# Patient Record
Sex: FEMALE | Race: WHITE | Employment: FULL TIME | URBAN - METROPOLITAN AREA
[De-identification: names, ages, dates, MRNs, and addresses within clinical notes are randomized per-mention and may not be internally consistent; named-entity substitution may affect disease eponyms.]

---

## 2017-04-24 ENCOUNTER — TELEPHONE (OUTPATIENT)
Dept: FAMILY MEDICINE | Facility: CLINIC | Age: 57
End: 2017-04-24

## 2017-04-24 DIAGNOSIS — Z12.11 SCREEN FOR COLON CANCER: Primary | ICD-10-CM

## 2017-04-24 NOTE — TELEPHONE ENCOUNTER
Panel Management Review      Patient has the following on her problem list: None      Composite cancer screening  Chart review shows that this patient is due/due soon for the following Fecal Colorectal (FIT)  Summary:    Patient is due/failing the following:   FIT    Action needed:   Routed to provider for review.    Type of outreach:    None, routed to provider for review.    Questions for provider review:    None                                                                                                                                    Angela Sheets MA       Chart routed to Provider .

## 2017-07-12 ENCOUNTER — TELEPHONE (OUTPATIENT)
Dept: FAMILY MEDICINE | Facility: CLINIC | Age: 57
End: 2017-07-12

## 2017-07-12 NOTE — TELEPHONE ENCOUNTER
Reason for call:  Other   Patient called regarding (reason for call): call back  Additional comments: Patient would like to have a H.Pylori test.; she would like the breath one if possible. Please contact patient with steps.    Phone number to reach patient:  Home number on file [phone] (home)    Best Time:  anytime    Can we leave a detailed message on this number?  YES

## 2017-07-13 ENCOUNTER — E-VISIT (OUTPATIENT)
Dept: FAMILY MEDICINE | Facility: CLINIC | Age: 57
End: 2017-07-13
Payer: COMMERCIAL

## 2017-07-13 DIAGNOSIS — K21.9 GASTROESOPHAGEAL REFLUX DISEASE WITHOUT ESOPHAGITIS: Primary | ICD-10-CM

## 2017-07-13 PROCEDURE — 99444 ZZC PHYSICIAN ONLINE EVALUATION & MANAGEMENT SERVICE: CPT | Performed by: FAMILY MEDICINE

## 2017-07-13 NOTE — TELEPHONE ENCOUNTER
Writer called pt to discuss scheduling appt no answer LVM for pt to return call       Nupur Kidd CMA

## 2017-07-13 NOTE — TELEPHONE ENCOUNTER
Patient notified of providers message as written.  Patient scheduled for OV on 7/18/17.  Patient states she is not sure how to schedule an e-visit but will try, if that works she will cancel the OV.   Sandi Jansen RN

## 2017-12-13 NOTE — PATIENT INSTRUCTIONS
[facility]    If you have any questions regarding to your visit please contact your care team:       Team Red:   Clinic Hours Telephone Number   Dr. Junie Fan  (pediatrics)  Tatum Thomas NP 7am-7pm  Monday - Thursday   7am-5pm  Fridays  [phone] (fax)    Stephen MEIER  [phone]   Urgent Care - [place] and [place] Monday-Friday  [place] - 11am-8pm  Saturday-Sunday  Both sites - 9am-5pm  [phone] - [facility]  [phone] - [place]       What options do I have for visits at the clinic other than the traditional office visit?  To expand how we care for you, many of our providers are utilizing electronic visits (e-visits) and telephone visits, when medically appropriate, for interactions with their patients rather than a visit in the clinic.   We also offer nurse visits for many medical concerns. Just like any other service, we will bill your insurance company for this type of visit based on time spent on the phone with your provider. Not all insurance companies cover these visits. Please check with your medical insurance if this type of visit is covered. You will be responsible for any charges that are not paid by your insurance.      E-visits via [place]:  generally incur a $35.00 fee.  Telephone visits:  Time spent on the phone: *charged based on time that is spent on the phone in increments of 10 minutes. Estimated cost:   5-10 mins $30.00   11-20 mins. $59.00   21-30 mins. $85.00     As always, Thank you for trusting us with your health care needs!              Preventive Health Recommendations  Female Ages 50 - 64    Yearly exam: See your health care provider every year in order to  o Review health changes.   o Discuss preventive care.    o Review your medicines if your doctor has prescribed any.      Get a Pap test every three years (unless you have an abnormal result and your provider advises testing more  often).    If you get Pap tests with HPV test, you only need to test every 5 years, unless you have an abnormal result.     You do not need a Pap test if your uterus was removed (hysterectomy) and you have not had cancer.    You should be tested each year for STDs (sexually transmitted diseases) if you're at risk.     Have a mammogram every 1 to 2 years.    Have a colonoscopy at age 50, or have a yearly FIT test (stool test). These exams screen for colon cancer.      Have a cholesterol test every 5 years, or more often if advised.    Have a diabetes test (fasting glucose) every three years. If you are at risk for diabetes, you should have this test more often.     If you are at risk for osteoporosis (brittle bone disease), think about having a bone density scan (DEXA).    Shots: Get a flu shot each year. Get a tetanus shot every 10 years.    Nutrition:     Eat at least 5 servings of fruits and vegetables each day.    Eat whole-grain bread, whole-wheat pasta and brown rice instead of white grains and rice.    Talk to your provider about Calcium and Vitamin D.     Lifestyle    Exercise at least 150 minutes a week (30 minutes a day, 5 days a week). This will help you control your weight and prevent disease.    Limit alcohol to one drink per day.    No smoking.     Wear sunscreen to prevent skin cancer.     See your dentist every six months for an exam and cleaning.    See your eye doctor every 1 to 2 years.    Discharged by Angela Sheets MA.

## 2017-12-18 ENCOUNTER — OFFICE VISIT (OUTPATIENT)
Dept: FAMILY MEDICINE | Facility: CLINIC | Age: 57
End: 2017-12-18
Payer: COMMERCIAL

## 2017-12-18 VITALS
BODY MASS INDEX: 30.63 KG/M2 | TEMPERATURE: 97.2 F | SYSTOLIC BLOOD PRESSURE: 124 MMHG | WEIGHT: 179.4 LBS | RESPIRATION RATE: 12 BRPM | HEART RATE: 75 BPM | HEIGHT: 64 IN | OXYGEN SATURATION: 100 % | DIASTOLIC BLOOD PRESSURE: 82 MMHG

## 2017-12-18 DIAGNOSIS — Z13.6 CARDIOVASCULAR SCREENING; LDL GOAL LESS THAN 160: ICD-10-CM

## 2017-12-18 DIAGNOSIS — Z12.31 VISIT FOR SCREENING MAMMOGRAM: ICD-10-CM

## 2017-12-18 DIAGNOSIS — Z23 NEED FOR PROPHYLACTIC VACCINATION AND INOCULATION AGAINST INFLUENZA: ICD-10-CM

## 2017-12-18 DIAGNOSIS — Z00.00 ROUTINE GENERAL MEDICAL EXAMINATION AT A HEALTH CARE FACILITY: Primary | ICD-10-CM

## 2017-12-18 DIAGNOSIS — Z12.11 SCREEN FOR COLON CANCER: ICD-10-CM

## 2017-12-18 DIAGNOSIS — Z12.4 SCREENING FOR MALIGNANT NEOPLASM OF CERVIX: ICD-10-CM

## 2017-12-18 LAB
CHOLEST SERPL-MCNC: 225 MG/DL
GLUCOSE SERPL-MCNC: 92 MG/DL (ref 70–99)
HDLC SERPL-MCNC: 74 MG/DL
LDLC SERPL CALC-MCNC: 137 MG/DL
NONHDLC SERPL-MCNC: 151 MG/DL
TRIGL SERPL-MCNC: 72 MG/DL

## 2017-12-18 PROCEDURE — 80061 LIPID PANEL: CPT | Performed by: NURSE PRACTITIONER

## 2017-12-18 PROCEDURE — 36415 COLL VENOUS BLD VENIPUNCTURE: CPT | Performed by: NURSE PRACTITIONER

## 2017-12-18 PROCEDURE — 99396 PREV VISIT EST AGE 40-64: CPT | Mod: 25 | Performed by: NURSE PRACTITIONER

## 2017-12-18 PROCEDURE — 90686 IIV4 VACC NO PRSV 0.5 ML IM: CPT | Performed by: NURSE PRACTITIONER

## 2017-12-18 PROCEDURE — G0145 SCR C/V CYTO,THINLAYER,RESCR: HCPCS | Performed by: NURSE PRACTITIONER

## 2017-12-18 PROCEDURE — 82947 ASSAY GLUCOSE BLOOD QUANT: CPT | Performed by: NURSE PRACTITIONER

## 2017-12-18 PROCEDURE — 90471 IMMUNIZATION ADMIN: CPT | Performed by: NURSE PRACTITIONER

## 2017-12-18 PROCEDURE — 87624 HPV HI-RISK TYP POOLED RSLT: CPT | Performed by: NURSE PRACTITIONER

## 2017-12-18 NOTE — PROGRESS NOTES
SUBJECTIVE:   CC: Teresa Mueller is an 57 year old woman who presents for preventive health visit.     Physical   Annual:     Getting at least 3 servings of Calcium per day::  Yes    Bi-annual eye exam::  Yes    Dental care twice a year::  Yes    Sleep apnea or symptoms of sleep apnea::  None    Diet::  Vegetarian/vegan and Gluten-free/reduced    Frequency of exercise::  None    Taking medications regularly::  Not Applicable    Additional concerns today::  No            Concern: none    Notes a history of difficulty losing weight. Restricts calories to 700-800/day at times and only loses a couple pounds over a period of 3 weeks. Tried eating more and then gained weight. Only eats vegetables and whole grains. Exercise more limited now due to plantar fasciitis, which is slowly improving.    Today's PHQ-2 Score: PHQ-2 ( 1999 Pfizer) 12/18/2017   Q1: Little interest or pleasure in doing things 0   Q2: Feeling down, depressed or hopeless 0   PHQ-2 Score 0   Q1: Little interest or pleasure in doing things Not at all   Q2: Feeling down, depressed or hopeless Not at all   PHQ-2 Score 0     Abuse: Current or Past(Physical, Sexual or Emotional)- NO  Do you feel safe in your environment - Yes    Social History   Substance Use Topics     Smoking status: Never Smoker     Smokeless tobacco: Never Used     Alcohol use Yes      Comment: OCC     Alcohol Use 12/18/2017   If you drink alcohol, do you typically have greater than 3 drinks per day OR greater than 7 drinks per week?   No   No flowsheet data found.      Reviewed orders with patient.  Reviewed health maintenance and updated orders accordingly - Yes  Fer Castellon    Labs reviewed in [facility]    Patient over age 50, mutual decision to screen reflected in health maintenance.      Pertinent mammograms are reviewed under the imaging tab.  History of abnormal Pap smear: NO - age 30-65 PAP every 5 years with negative HPV co-testing recommended    Reviewed and updated as  "needed this visit by clinical staffTobacco  Allergies  Meds  Med Hx  Surg Hx  Fam Hx  Soc Hx        Reviewed and updated as needed this visit by Provider            Review of Systems  CONSTITUTIONAL:POSITIVE  for weight gain  I: NEGATIVE for worrisome rashes, moles or lesions  E: NEGATIVE for vision changes or irritation  ENT: NEGATIVE for ear, mouth and throat problems  R: NEGATIVE for significant cough or SOB  B: NEGATIVE for masses, tenderness or discharge  CV: NEGATIVE for chest pain, palpitations or peripheral edema  GI: NEGATIVE for nausea, abdominal pain, heartburn, or change in bowel habits  : NEGATIVE for unusual urinary or vaginal symptoms. Periods are regular.  M: NEGATIVE for significant arthralgias or myalgia  N: NEGATIVE for weakness, dizziness or paresthesias  P: NEGATIVE for changes in mood or affect     OBJECTIVE:   Pulse 75  Temp 97.2  F (36.2  C) (Oral)  Resp 12  Ht 5' 3.75\" (1.619 m)  Wt 179 lb 6.4 oz (81.4 kg)  LMP 12/28/2014  SpO2 100%  Breastfeeding? No  BMI 31.04 kg/m2  Physical Exam  GENERAL: healthy, alert and no distress  EYES: Eyes grossly normal to inspection, PERRL and conjunctivae and sclerae normal  HENT: ear canals and TM's normal, nose and mouth without ulcers or lesions  NECK: no adenopathy, no asymmetry, masses, or scars and thyroid normal to palpation  RESP: lungs clear to auscultation - no rales, rhonchi or wheezes  BREAST: normal without masses, tenderness or nipple discharge and no palpable axillary masses or adenopathy  CV: regular rate and rhythm, normal S1 S2, no S3 or S4, no murmur, click or rub, no peripheral edema and peripheral pulses strong  ABDOMEN: soft, nontender, no hepatosplenomegaly, no masses and bowel sounds normal   (female): normal female external genitalia, normal urethral meatus, vaginal mucosa pink, moist, well rugated, and normal cervix/adnexa/uterus without masses or discharge  MS: no gross musculoskeletal defects noted, no edema  SKIN: " "no suspicious lesions or rashes  NEURO: Normal strength and tone, mentation intact and speech normal  PSYCH: mentation appears normal, affect normal/bright    ASSESSMENT/PLAN:   1. Routine general medical examination at a health care facility      2. Screen for colon cancer    - GASTROENTEROLOGY ADULT REF PROCEDURE ONLY    3. Visit for screening mammogram    - MA SCREENING DIGITAL BILAT - Future  (s+30); Future    4. Screening for malignant neoplasm of cervix    - Pap imaged thin layer screen with HPV - recommended age 30 - 65 years (select HPV order below)  - HPV High Risk Types DNA Cervical    5. Need for prophylactic vaccination and inoculation against influenza    - FLU VAC, SPLIT VIRUS IM > 3 YO (QUADRIVALENT) [47097]  - Vaccine Administration, Initial [52266]    6. CARDIOVASCULAR SCREENING; LDL GOAL LESS THAN 160    - Lipid panel reflex to direct LDL Fasting  - Glucose    7. BMI 31.0-31.9,adult  Recommend evaluation with Dr. Mendez- may benefit from calorimetry testing.  - BARIATRIC ADULT REFERRAL    COUNSELING:  Reviewed preventive health counseling, as reflected in patient instructions       Regular exercise       Healthy diet/nutrition       Osteoporosis Prevention/Bone Health       Colon cancer screening         reports that she has never smoked. She has never used smokeless tobacco.    Estimated body mass index is 31.04 kg/(m^2) as calculated from the following:    Height as of this encounter: 5' 3.75\" (1.619 m).    Weight as of this encounter: 179 lb 6.4 oz (81.4 kg).   Weight management plan: Patient referred to endocrine and/or weight management specialty    Counseling Resources:  ATP IV Guidelines  Pooled Cohorts Equation Calculator  Breast Cancer Risk Calculator  FRAX Risk Assessment  ICSI Preventive Guidelines  Dietary Guidelines for Americans, 2010  USDA's MyPlate  ASA Prophylaxis  Lung CA Screening    BELINDA Downs [facility] [place]  Answers for HPI/ROS submitted by the " patient on 12/18/2017   PHQ-2 Score: 0

## 2017-12-18 NOTE — PROGRESS NOTES

## 2017-12-18 NOTE — MR AVS SNAPSHOT
After Visit Summary   12/18/2017    Teresa SMITH Violet    MRN: [id number]           Patient Information     Date Of Birth          1960        Visit Information        Provider Department      12/18/2017 7:00 AM Tatum Thomas APRN [facility]        Today's Diagnoses     Routine general medical examination at a health care facility    -  1    Screen for colon cancer        Visit for screening mammogram        Screening for malignant neoplasm of cervix        Need for prophylactic vaccination and inoculation against influenza        CARDIOVASCULAR SCREENING; LDL GOAL LESS THAN 160        BMI 31.0-31.9,adult          Care Instructions    [facility]    If you have any questions regarding to your visit please contact your care team:       Team Red:   Clinic Hours Telephone Number   Dr. Junie Fan  (pediatrics)  Tatum Thomas NP 7am-7pm  Monday - Thursday   7am-5pm  Fridays  [phone] (fax)    Stephen MEIER  [phone]   Urgent Care - [place] and [place] Monday-Friday  [place] - 11am-8pm  Saturday-Sunday  Both sites - 9am-5pm  [phone] - [facility]  [phone] - [place]       What options do I have for visits at the clinic other than the traditional office visit?  To expand how we care for you, many of our providers are utilizing electronic visits (e-visits) and telephone visits, when medically appropriate, for interactions with their patients rather than a visit in the clinic.   We also offer nurse visits for many medical concerns. Just like any other service, we will bill your insurance company for this type of visit based on time spent on the phone with your provider. Not all insurance companies cover these visits. Please check with your medical insurance if this type of visit is covered. You will be responsible for any charges that are not paid by your insurance.      E-visits via  [place]:  generally incur a $35.00 fee.  Telephone visits:  Time spent on the phone: *charged based on time that is spent on the phone in increments of 10 minutes. Estimated cost:   5-10 mins $30.00   11-20 mins. $59.00   21-30 mins. $85.00     As always, Thank you for trusting us with your health care needs!              Preventive Health Recommendations  Female Ages 50 - 64    Yearly exam: See your health care provider every year in order to  o Review health changes.   o Discuss preventive care.    o Review your medicines if your doctor has prescribed any.      Get a Pap test every three years (unless you have an abnormal result and your provider advises testing more often).    If you get Pap tests with HPV test, you only need to test every 5 years, unless you have an abnormal result.     You do not need a Pap test if your uterus was removed (hysterectomy) and you have not had cancer.    You should be tested each year for STDs (sexually transmitted diseases) if you're at risk.     Have a mammogram every 1 to 2 years.    Have a colonoscopy at age 50, or have a yearly FIT test (stool test). These exams screen for colon cancer.      Have a cholesterol test every 5 years, or more often if advised.    Have a diabetes test (fasting glucose) every three years. If you are at risk for diabetes, you should have this test more often.     If you are at risk for osteoporosis (brittle bone disease), think about having a bone density scan (DEXA).    Shots: Get a flu shot each year. Get a tetanus shot every 10 years.    Nutrition:     Eat at least 5 servings of fruits and vegetables each day.    Eat whole-grain bread, whole-wheat pasta and brown rice instead of white grains and rice.    Talk to your provider about Calcium and Vitamin D.     Lifestyle    Exercise at least 150 minutes a week (30 minutes a day, 5 days a week). This will help you control your weight and prevent disease.    Limit alcohol to one drink per day.    No  smoking.     Wear sunscreen to prevent skin cancer.     See your dentist every six months for an exam and cleaning.    See your eye doctor every 1 to 2 years.    Discharged by Angela Sheets MA.            Follow-ups after your visit        Additional Services     BARIATRIC ADULT REFERRAL       Your provider has referred you to: [facility]- Dr. Mendez [phone]    Please be aware that coverage of these services is subject to the terms and limitations of your health insurance plan.  Call member services at your health plan with any benefit or coverage questions.      Please bring the following with you to your appointment:      (1) List of current medications   (2) This referral request   (3) Any documents/labs given to you for this referral            GASTROENTEROLOGY ADULT REF PROCEDURE ONLY       Last Lab Result: Creatinine (mg/dL)       Date                     Value                 10/29/2015               0.97             ----------  Body mass index is 31.04 kg/(m^2).      Patient will be contacted to schedule procedure.     Please be aware that coverage of these services is subject to the terms and limitations of your health insurance plan.  Call member services at your health plan with any benefit or coverage questions.  Any procedures must be performed at a [facility] facility OR coordinated by your clinic's referral office.    Please bring the following with you to your appointment:    (1) Any X-Rays, CTs or MRIs which have been performed.  Contact the facility where they were done to arrange for  prior to your scheduled appointment.    (2) List of current medications   (3) This referral request   (4) Any documents/labs given to you for this referral                  Future tests that were ordered for you today     Open Future Orders        Priority Expected Expires Ordered    MA SCREENING DIGITAL BILAT - Future  (s+30) Routine  12/13/2018 12/18/2017            Who to contact     If you  "have questions or need follow up information about today's clinic visit or your schedule please contact [facility] directly at [phone].  Normal or non-critical lab and imaging results will be communicated to you by MyChart, letter or phone within 4 business days after the clinic has received the results. If you do not hear from us within 7 days, please contact the clinic through [employer] or phone. If you have a critical or abnormal lab result, we will notify you by phone as soon as possible.  Submit refill requests through [place] or call your pharmacy and they will forward the refill request to us. Please allow 3 business days for your refill to be completed.          Additional Information About Your Visit        [employer] Information     [employer] gives you secure access to your electronic health record. If you see a primary care provider, you can also send messages to your care team and make appointments. If you have questions, please call your primary care clinic.  If you do not have a primary care provider, please call [phone] and they will assist you.        Care EveryWhere ID     This is your Care EveryWhere ID. This could be used by other organizations to access your [facility] medical records  [id number]        Your Vitals Were     Pulse Temperature Respirations Height Last Period Pulse Oximetry    75 97.2  F (36.2  C) (Oral) 12 5' 3.75\" (1.619 m) 12/28/2014 100%    Breastfeeding? BMI (Body Mass Index)                No 31.04 kg/m2           Blood Pressure from Last 3 Encounters:   12/18/17 124/82   12/16/16 136/86   12/30/15 122/80    Weight from Last 3 Encounters:   12/18/17 179 lb 6.4 oz (81.4 kg)   12/16/16 182 lb (82.6 kg)   12/30/15 179 lb 12.8 oz (81.6 kg)              We Performed the Following     BARIATRIC ADULT REFERRAL     GASTROENTEROLOGY ADULT REF PROCEDURE ONLY     Glucose     HPV High Risk Types DNA Cervical     Lipid panel reflex to direct LDL Fasting     Pap imaged thin " layer screen with HPV - recommended age 30 - 65 years (select HPV order below)        Primary Care Provider Office Phone # Fax #    [facility] [phone] [address] [facility] [address]        Equal Access to Services     DERECK NICHOLS : Hadii aad ku hadasho Soomaali, waaxda luqadaha, qaybta kaalmada adeegyada, waxay idiin hayaan adeeg khchaztoño latracy hogan. So [facility] [phone].    ATENCIÓN: Si habla español, tiene a houser disposición servicios gratuitos de asistencia lingüística. Llame al [phone].    We comply with applicable federal civil rights laws and [place] laws. We do not discriminate on the basis of race, color, national origin, age, disability, sex, sexual orientation, or gender identity.            Thank you!     Thank you for choosing [facility]  for your care. Our goal is always to provide you with excellent care. Hearing back from our patients is one way we can continue to improve our services. Please take a few minutes to complete the written survey that you may receive in the mail after your visit with us. Thank you!             Your Updated Medication List - Protect others around you: Learn how to safely use, store and throw away your medicines at www.disposemymeds.org.          This list is accurate as of: 12/18/17  7:59 AM.  Always use your most recent med list.                   Brand Name Dispense Instructions for use Diagnosis    MULTI-VITAMIN PO       Routine general medical examination at a health care facility       VITAMIN D PO      Take 5,000 Units by mouth daily

## 2017-12-18 NOTE — NURSING NOTE
"Chief Complaint   Patient presents with     Physical       Initial /82 (BP Location: Left arm, Patient Position: Chair, Cuff Size: Adult Regular)  Pulse 75  Temp 97.2  F (36.2  C) (Oral)  Resp 12  Ht 5' 3.75\" (1.619 m)  Wt 179 lb 6.4 oz (81.4 kg)  LMP 12/28/2014  SpO2 100%  Breastfeeding? No  BMI 31.04 kg/m2 Estimated body mass index is 31.04 kg/(m^2) as calculated from the following:    Height as of this encounter: 5' 3.75\" (1.619 m).    Weight as of this encounter: 179 lb 6.4 oz (81.4 kg).  Medication Reconciliation: complete     Fer Castellon      "

## 2017-12-19 LAB
COPATH REPORT: NORMAL
PAP: NORMAL

## 2017-12-20 ENCOUNTER — TRANSFERRED RECORDS (OUTPATIENT)
Dept: HEALTH INFORMATION MANAGEMENT | Facility: CLINIC | Age: 57
End: 2017-12-20

## 2017-12-21 LAB
FINAL DIAGNOSIS: NORMAL
HPV HR 12 DNA CVX QL NAA+PROBE: NEGATIVE
HPV16 DNA SPEC QL NAA+PROBE: NEGATIVE
HPV18 DNA SPEC QL NAA+PROBE: NEGATIVE
SPECIMEN DESCRIPTION: NORMAL

## 2018-02-26 ENCOUNTER — MYC MEDICAL ADVICE (OUTPATIENT)
Dept: FAMILY MEDICINE | Facility: CLINIC | Age: 58
End: 2018-02-26

## 2018-02-26 NOTE — TELEPHONE ENCOUNTER
Tatum,  Please see mychart message below and advise. Pt states in mychart message:  I will come in if results are over 160.......I checked monthly last year and usually 130-145.    Geneva Daugherty RN  [facility] [place]

## 2018-03-05 ENCOUNTER — TELEPHONE (OUTPATIENT)
Dept: FAMILY MEDICINE | Facility: CLINIC | Age: 58
End: 2018-03-05

## 2018-03-05 NOTE — TELEPHONE ENCOUNTER
3/5/2018    Call Regarding Preventive Health Screening Colonoscopy/FIT and Mammogram    Attempt 1    Message on voicemail     Comments:          Outreach   AT

## 2018-03-30 NOTE — TELEPHONE ENCOUNTER
3/30/2018    Call Regarding Preventive Health Screening Colonoscopy/FIT and Mammogram    Attempt 2    Message on voicemail     Comments:       Outreach   CC

## 2018-04-04 ENCOUNTER — TRANSFERRED RECORDS (OUTPATIENT)
Dept: HEALTH INFORMATION MANAGEMENT | Facility: CLINIC | Age: 58
End: 2018-04-04

## 2018-04-05 ENCOUNTER — RADIANT APPOINTMENT (OUTPATIENT)
Dept: MAMMOGRAPHY | Facility: CLINIC | Age: 58
End: 2018-04-05
Attending: NURSE PRACTITIONER
Payer: COMMERCIAL

## 2018-04-05 DIAGNOSIS — Z12.31 VISIT FOR SCREENING MAMMOGRAM: ICD-10-CM

## 2018-04-05 PROCEDURE — 77067 SCR MAMMO BI INCL CAD: CPT | Mod: TC

## 2018-04-09 ENCOUNTER — TELEPHONE (OUTPATIENT)
Dept: FAMILY MEDICINE | Facility: CLINIC | Age: 58
End: 2018-04-09

## 2018-04-09 ENCOUNTER — TRANSFERRED RECORDS (OUTPATIENT)
Dept: HEALTH INFORMATION MANAGEMENT | Facility: CLINIC | Age: 58
End: 2018-04-09

## 2018-04-09 NOTE — TELEPHONE ENCOUNTER
Spoke with patient and informed patient that we received lab results of positive H. Pylori. PCP wanted to f/u with patient. Patient requested that she would like to try non medicinal methods first, then be re tested and if still positive will follow up with PCP.   RN verbalized understanding.     Yvonne Garcia RN

## 2018-04-17 ENCOUNTER — TELEPHONE (OUTPATIENT)
Dept: FAMILY MEDICINE | Facility: CLINIC | Age: 58
End: 2018-04-17

## 2018-04-17 ENCOUNTER — MYC MEDICAL ADVICE (OUTPATIENT)
Dept: FAMILY MEDICINE | Facility: CLINIC | Age: 58
End: 2018-04-17

## 2018-04-17 DIAGNOSIS — Z12.11 SPECIAL SCREENING FOR MALIGNANT NEOPLASMS, COLON: Primary | ICD-10-CM

## 2018-04-17 NOTE — TELEPHONE ENCOUNTER
Reason for Call:  Other call back    Detailed comments: Patient refuses to do colonoscopy prep. Would like to do FIT test. Please advise.    Phone Number Patient can be reached at: Home number on file [phone] (home)    Best Time: Any    Can we leave a detailed message on this number? YES    Call taken on 4/17/2018 at 4:20 PM by Nora Dasilva

## 2018-04-20 ENCOUNTER — MEDICAL CORRESPONDENCE (OUTPATIENT)
Dept: HEALTH INFORMATION MANAGEMENT | Facility: CLINIC | Age: 58
End: 2018-04-20

## 2018-04-20 ENCOUNTER — OFFICE VISIT (OUTPATIENT)
Dept: FAMILY MEDICINE | Facility: CLINIC | Age: 58
End: 2018-04-20
Payer: COMMERCIAL

## 2018-04-20 VITALS
BODY MASS INDEX: 31.76 KG/M2 | HEIGHT: 64 IN | SYSTOLIC BLOOD PRESSURE: 130 MMHG | TEMPERATURE: 97 F | HEART RATE: 99 BPM | DIASTOLIC BLOOD PRESSURE: 88 MMHG | OXYGEN SATURATION: 100 % | WEIGHT: 186 LBS

## 2018-04-20 DIAGNOSIS — R10.13 ABDOMINAL PAIN, EPIGASTRIC: ICD-10-CM

## 2018-04-20 DIAGNOSIS — H53.2 DIPLOPIA: Primary | ICD-10-CM

## 2018-04-20 DIAGNOSIS — R51.9 ACUTE NONINTRACTABLE HEADACHE, UNSPECIFIED HEADACHE TYPE: ICD-10-CM

## 2018-04-20 DIAGNOSIS — A04.8 H. PYLORI INFECTION: ICD-10-CM

## 2018-04-20 LAB
ALBUMIN SERPL-MCNC: 4.2 G/DL (ref 3.4–5)
ALP SERPL-CCNC: 74 U/L (ref 40–150)
ALT SERPL W P-5'-P-CCNC: 17 U/L (ref 0–50)
ANION GAP SERPL CALCULATED.3IONS-SCNC: 7 MMOL/L (ref 3–14)
AST SERPL W P-5'-P-CCNC: 21 U/L (ref 0–45)
BILIRUB SERPL-MCNC: 0.4 MG/DL (ref 0.2–1.3)
BUN SERPL-MCNC: 12 MG/DL (ref 7–30)
CALCIUM SERPL-MCNC: 9.3 MG/DL (ref 8.5–10.1)
CHLORIDE SERPL-SCNC: 106 MMOL/L (ref 94–109)
CO2 SERPL-SCNC: 28 MMOL/L (ref 20–32)
CREAT SERPL-MCNC: 0.83 MG/DL (ref 0.52–1.04)
GFR SERPL CREATININE-BSD FRML MDRD: 71 ML/MIN/1.7M2
GLUCOSE SERPL-MCNC: 89 MG/DL (ref 70–99)
LIPASE SERPL-CCNC: 115 U/L (ref 73–393)
POTASSIUM SERPL-SCNC: 4.1 MMOL/L (ref 3.4–5.3)
PROT SERPL-MCNC: 7.7 G/DL (ref 6.8–8.8)
SODIUM SERPL-SCNC: 141 MMOL/L (ref 133–144)

## 2018-04-20 PROCEDURE — 36415 COLL VENOUS BLD VENIPUNCTURE: CPT | Performed by: NURSE PRACTITIONER

## 2018-04-20 PROCEDURE — 80053 COMPREHEN METABOLIC PANEL: CPT | Performed by: NURSE PRACTITIONER

## 2018-04-20 PROCEDURE — 99215 OFFICE O/P EST HI 40 MIN: CPT | Performed by: NURSE PRACTITIONER

## 2018-04-20 PROCEDURE — 83690 ASSAY OF LIPASE: CPT | Performed by: NURSE PRACTITIONER

## 2018-04-20 RX ORDER — AMOXICILLIN 500 MG/1
1000 CAPSULE ORAL 2 TIMES DAILY
Qty: 56 CAPSULE | Refills: 0 | Status: SHIPPED | OUTPATIENT
Start: 2018-04-20 | End: 2018-05-04

## 2018-04-20 RX ORDER — CLARITHROMYCIN 500 MG
500 TABLET ORAL 2 TIMES DAILY
Qty: 28 TABLET | Refills: 0 | Status: SHIPPED | OUTPATIENT
Start: 2018-04-20 | End: 2018-05-04

## 2018-04-20 NOTE — MR AVS SNAPSHOT
After Visit Summary   4/20/2018    Teresa SMITH Mueller    MRN: [id number]           Patient Information     Date Of Birth          1960        Visit Information        Provider Department      4/20/2018 2:20 PM Tatum Thomas APRN [facility]        Today's Diagnoses     Diplopia    -  1    Acute nonintractable headache, unspecified headache type        H. pylori infection        Abdominal pain, epigastric          Care Instructions    See your eye doctor within the week- if you can't get an appointment, let me know & I'll help you get an appointment with an eye doctor here.    Schedule your brain scan.    Start the medication for H. Pylori. We'll check your liver and pancreas today also.    See me in 1 month to recheck your stomach and talk about the vaginal bleeding.    [facility] (formerly [facility])    If you have any questions regarding to your visit please contact your care team:       Team Red:   Clinic Hours Telephone Number   Dr. Junie Thomas, NP   7am-7pm  Monday - Thursday   7am-5pm  Fridays  [phone]  (Appointment scheduling available 24/7)    Questions about your visit?   Team Line  [phone]   Urgent Care - [place] and [place] - 11am-9pm Monday-Friday Saturday-Sunday- 9am-5pm   [place] - 5pm-9pm Monday-Friday Saturday-Sunday- 9am-5pm  [phone] - [place]   [phone] - [place]       What options do I have for visits at the clinic other than the traditional office visit?  To expand how we care for you, many of our providers are utilizing electronic visits (e-visits) and telephone visits, when medically appropriate, for interactions with their patients rather than a visit in the clinic.   We also offer nurse visits for many medical concerns. Just like any other service, we will bill your insurance company for this type of visit based on time spent on the phone with your provider.  Not all insurance companies cover these visits. Please check with your medical insurance if this type of visit is covered. You will be responsible for any charges that are not paid by your insurance.      E-visits via Bionovo:  generally incur a $35.00 fee.  Telephone visits:  Time spent on the phone: *charged based on time that is spent on the phone in increments of 10 minutes. Estimated cost:   5-10 mins $30.00   11-20 mins. $59.00   21-30 mins. $85.00     Use Bionovo (secure email communication and access to your chart) to send your primary care provider a message or make an appointment. Ask someone on your Team how to sign up for Bionovo.  For a Price Quote for your services, please call our RiverOne Line at [phone].      As always, Thank you for trusting us with your health care needs!    Discharged by Angela Sheets MA.            Follow-ups after your visit        Future tests that were ordered for you today     Open Future Orders        Priority Expected Expires Ordered    MR Brain w/o Contrast Routine  4/20/2019 4/20/2018            Who to contact     If you have questions or need follow up information about today's clinic visit or your schedule please contact [facility] directly at [phone].  Normal or non-critical lab and imaging results will be communicated to you by Olacabshart, letter or phone within 4 business days after the clinic has received the results. If you do not hear from us within 7 days, please contact the clinic through Olacabshart or phone. If you have a critical or abnormal lab result, we will notify you by phone as soon as possible.  Submit refill requests through Bionovo or call your pharmacy and they will forward the refill request to us. Please allow 3 business days for your refill to be completed.          Additional Information About Your Visit        OlacabsharOM Latam Information     Bionovo gives you secure access to your electronic health record. If you see a primary  "care provider, you can also send messages to your care team and make appointments. If you have questions, please call your primary care clinic.  If you do not have a primary care provider, please call [phone] and they will assist you.        Care EveryWhere ID     This is your Care EveryWhere ID. This could be used by other organizations to access your [place] medical records  [id number]        Your Vitals Were     Pulse Temperature Height Last Period Pulse Oximetry BMI (Body Mass Index)    99 97  F (36.1  C) (Oral) 5' 3.75\" (1.619 m) 12/28/2014 100% 32.18 kg/m2       Blood Pressure from Last 3 Encounters:   04/20/18 130/88   12/18/17 124/82   12/16/16 136/86    Weight from Last 3 Encounters:   04/20/18 186 lb (84.4 kg)   12/18/17 179 lb 6.4 oz (81.4 kg)   12/16/16 182 lb (82.6 kg)              We Performed the Following     Comprehensive metabolic panel     Lipase          Today's Medication Changes          These changes are accurate as of 4/20/18  2:42 PM.  If you have any questions, ask your nurse or doctor.               Start taking these medicines.        Dose/Directions    amoxicillin 500 MG capsule   Commonly known as:  AMOXIL   Used for:  H. pylori infection   Started by:  Tatum Thomas APRN CNP        Dose:  1000 mg   Take 2 capsules (1,000 mg) by mouth 2 times daily for 14 days   Quantity:  56 capsule   Refills:  0       clarithromycin 500 MG tablet   Commonly known as:  BIAXIN   Used for:  H. pylori infection   Started by:  Tatum Thomas APRN CNP        Dose:  500 mg   Take 1 tablet (500 mg) by mouth 2 times daily for 14 days   Quantity:  28 tablet   Refills:  0       omeprazole 20 MG CR capsule   Commonly known as:  priLOSEC   Used for:  H. pylori infection   Started by:  Tatum Thomas APRN CNP        Dose:  20 mg   Take 1 capsule (20 mg) by mouth 2 times daily for 14 days   Quantity:  28 capsule   Refills:  0            Where to get your medicines      These " medications were sent to [id number] - [facility] [address]  [address] [address]     Phone:  [phone]     amoxicillin 500 MG capsule    clarithromycin 500 MG tablet    omeprazole 20 MG CR capsule                Primary Care Provider Office Phone # Fax #    Tatum Thomas, APRN [facility] [phone]       [address] [address]        Equal Access to Services     DERECK NICHOLS : Hadii aad ku hadasho Soomaali, waaxda luqadaha, qaybta kaalmada adeegyada, waxay idiin hayaan adeeg kharatoño pringle . So [facility] [phone].    ATENCIÓN: Si habla español, tiene a houser disposición servicios gratuitos de asistencia lingüística. Anton al [phone].    We comply with applicable federal civil rights laws and [place] laws. We do not discriminate on the basis of race, color, national origin, age, disability, sex, sexual orientation, or gender identity.            Thank you!     Thank you for choosing [facility]  for your care. Our goal is always to provide you with excellent care. Hearing back from our patients is one way we can continue to improve our services. Please take a few minutes to complete the written survey that you may receive in the mail after your visit with us. Thank you!             Your Updated Medication List - Protect others around you: Learn how to safely use, store and throw away your medicines at www.disposemymeds.org.          This list is accurate as of 4/20/18  2:42 PM.  Always use your most recent med list.                   Brand Name Dispense Instructions for use Diagnosis    amoxicillin 500 MG capsule    AMOXIL    56 capsule    Take 2 capsules (1,000 mg) by mouth 2 times daily for 14 days    H. pylori infection       clarithromycin 500 MG tablet    BIAXIN    28 tablet    Take 1 tablet (500 mg) by mouth 2 times daily for 14 days    H. pylori infection       MULTI-VITAMIN PO        Routine general medical examination at a health care facility       omeprazole 20 MG CR capsule    priLOSEC    28 capsule    Take 1 capsule (20 mg) by mouth 2 times daily for 14 days    H. pylori infection       VITAMIN D PO      Take 5,000 Units by mouth daily

## 2018-04-20 NOTE — PATIENT INSTRUCTIONS
See your eye doctor within the week- if you can't get an appointment, let me know & I'll help you get an appointment with an eye doctor here.    Schedule your brain scan.    Start the medication for H. Pylori. We'll check your liver and pancreas today also.    See me in 1 month to recheck your stomach and talk about the vaginal bleeding.    [facility]    If you have any questions regarding to your visit please contact your care team:       Team Red:   Clinic Hours Telephone Number   Dr. Junie Thomas, NP   7am-7pm  Monday - Thursday   7am-5pm  Fridays  [phone]  (Appointment scheduling available 24/7)    Questions about your visit?   Team Line  [phone]   Urgent Care - [place] and [facility] - 11am-9pm Monday-Friday Saturday-Sunday- 9am-5pm   [place] - 5pm-9pm Monday-Friday Saturday-Sunday- 9am-5pm  [phone] - [place]   [phone] - [place]       What options do I have for visits at the clinic other than the traditional office visit?  To expand how we care for you, many of our providers are utilizing electronic visits (e-visits) and telephone visits, when medically appropriate, for interactions with their patients rather than a visit in the clinic.   We also offer nurse visits for many medical concerns. Just like any other service, we will bill your insurance company for this type of visit based on time spent on the phone with your provider. Not all insurance companies cover these visits. Please check with your medical insurance if this type of visit is covered. You will be responsible for any charges that are not paid by your insurance.      E-visits via Flynn:  generally incur a $35.00 fee.  Telephone visits:  Time spent on the phone: *charged based on time that is spent on the phone in increments of 10 minutes. Estimated cost:   5-10 mins $30.00   11-20 mins. $59.00   21-30 mins. $85.00     Use Flynn (secure  email communication and access to your chart) to send your primary care provider a message or make an appointment. Ask someone on your Team how to sign up for Takeacodert.  For a Price Quote for your services, please call our Pixowl Price Line at [phone].      As always, Thank you for trusting us with your health care needs!    Discharged by Angela Sheets MA.

## 2018-04-20 NOTE — PROGRESS NOTES
SUBJECTIVE:   Teresa Mueller is a 57 year old female who presents to clinic today for the following health issues:      Fatigue/Weakness      Duration: 6 months    Description (location/character/radiation): Fatigue, Weakness, loss of fine motor skills, feels foggy, rapid heart beat, weight gain, bloating    Intensity:  severe    Accompanying signs and symptoms: states tested positive for H Pylori    History (similar episodes/previous evaluation): None    Precipitating or alleviating factors: None    Therapies tried and outcome: None     Patient complains of double vision ongoing for the last month with associated right-sided headache. Diplopia is most bothersome while driving; occurs even with monocular vision. Driving without glasses because they worsen the diplopia. Denies flashing lights, floaters, loss of vision. History of bilateral retinal detachment s/p repair and last had eye exam in December 2017.  Related symptoms include numbness in fingertip of the left hand x2 weeks, right shoulder pain, dizziness, tachycardia. Denies facial droop, slurred speech.    Patient also complains of epigastric abdominal pain, belching, bloating, nausea. She recently paid for labs to be done at an outside, independent laboratory- had a negative troponin and positive H pylori. She is eating sprouts to treat her H. Pylori. She is very concerned about pancreas disease.      Problem list and histories reviewed & adjusted, as indicated.  Additional history: as documented    Patient Active Problem List   Diagnosis     Tinnitus     IBS (irritable bowel syndrome)     CARDIOVASCULAR SCREENING; LDL GOAL LESS THAN 160     Advanced directives, counseling/discussion     Abnormal Pap smear of cervix     BMI 31.0-31.9,adult     Past Surgical History:   Procedure Laterality Date     C APPENDECTOMY       LASIK  9-00    bilat     TUBAL LIGATION         Social History   Substance Use Topics     Smoking status: Never Smoker     Smokeless  "tobacco: Never Used     Alcohol use Yes      Comment: OCC     Family History   Problem Relation Age of Onset     Hypertension Mother      Colon Cancer Mother 76     DIABETES Father      adult onset     CEREBROVASCULAR DISEASE Father      age 64     HEART DISEASE Father      age 65     Eye Disorder Father      GLAUCOMA     Hypertension Father      HEART DISEASE Maternal Grandmother      CHF     HEART DISEASE Maternal Grandfather      Alzheimer Disease Paternal Grandmother      HEART DISEASE Paternal Grandfather      HEART DISEASE Brother      aortic weak     Alcohol/Drug Sister      alcohol use and drugs use     Arthritis Brother      adult      Eye Disorder Brother      GLAUCOMA           Reviewed and updated as needed this visit by clinical staff       Reviewed and updated as needed this visit by Provider         ROS:  CONSTITUTIONAL: NEGATIVE for fever, chills, change in weight  INTEGUMENTARY/SKIN: NEGATIVE for worrisome rashes, moles or lesions  EYES: POSITIVE for diplopia  ENT/MOUTH: NEGATIVE for ear, mouth and throat problems  RESP:POSITIVE for SOB- can walk several blocks without stopping to rest  CV: POSITIVE for palpitations, tachycardia  GI: POSITIVE for epigastric pain, bloating  : NEGATIVE for frequency, dysuria, or hematuria  MUSCULOSKELETAL: NEGATIVE for significant arthralgias or myalgia  NEURO: POSITIVE for headache, left fingertip numbness    OBJECTIVE:     /88 (BP Location: Left arm, Patient Position: Chair, Cuff Size: Adult Regular)  Pulse 99  Temp 97  F (36.1  C) (Oral)  Ht 5' 3.75\" (1.619 m)  Wt 186 lb (84.4 kg)  LMP 12/28/2014  SpO2 100%  BMI 32.18 kg/m2  Body mass index is 32.18 kg/(m^2).  GENERAL: healthy, alert and no distress  EYES: Eyes grossly normal to inspection, PERRL and conjunctivae and sclerae normal; visual fields intact and no diplopia while in clinic  HENT: ear canals and TM's normal, nose and mouth without ulcers or lesions  NECK: no adenopathy, no asymmetry, " masses, or scars and thyroid normal to palpation  RESP: lungs clear to auscultation - no rales, rhonchi or wheezes  CV: regular rate and rhythm, normal S1 S2, no S3 or S4, no murmur, click or rub, no peripheral edema and peripheral pulses strong  ABDOMEN: tenderness epigastric, no organomegaly or masses and bowel sounds normal  MS: no gross musculoskeletal defects noted, no edema  SKIN: no suspicious lesions or rashes  NEURO: Normal strength and tone, sensory exam grossly normal, mentation intact, speech normal, cranial nerves 2-12 intact, gait normal including heel/toe/tandem walking, Romberg normal and rapid alternating movements normal  PSYCH: anxious    Diagnostic Test Results:  No results found for this or any previous visit (from the past 24 hour(s)).    ASSESSMENT/PLAN:     1. Diplopia  I discussed case with Dr. Booker in ophthalmology- ok for patient to follow up in clinic next week and may use liquid tears to help in the meantime. Patient will follow up with her outside ophthalmologist but should contact me if she has difficulty obtaining an appointment.    2. Acute nonintractable headache, unspecified headache type  Due to new onset headache and other neuro symptoms, recommend brain MRI. To Emergency Room for any slurred speech, weakness, facial droop.  - MR Brain w/o Contrast; Future    3. H. pylori infection  Dietary treatment will not be effective- patient willing to start triple therapy.  I believe this is the cause of all of her symptoms, will follow up in 1 month to see if these resolve after treatment.  - omeprazole (PRILOSEC) 20 MG CR capsule; Take 1 capsule (20 mg) by mouth 2 times daily for 14 days  Dispense: 28 capsule; Refill: 0  - clarithromycin (BIAXIN) 500 MG tablet; Take 1 tablet (500 mg) by mouth 2 times daily for 14 days  Dispense: 28 tablet; Refill: 0  - amoxicillin (AMOXIL) 500 MG capsule; Take 2 capsules (1,000 mg) by mouth 2 times daily for 14 days  Dispense: 56 capsule; Refill:  0    4. Abdominal pain, epigastric  As above- will do labs to r/o additional causes of pain  - Lipase  - Comprehensive metabolic panel    Follow up with opthamology, schedule MRI, see provider in 1 month    BELINDA Downs [facility]

## 2018-04-23 ENCOUNTER — RADIANT APPOINTMENT (OUTPATIENT)
Dept: MRI IMAGING | Facility: CLINIC | Age: 58
End: 2018-04-23
Attending: NURSE PRACTITIONER
Payer: COMMERCIAL

## 2018-04-23 ENCOUNTER — TELEPHONE (OUTPATIENT)
Dept: FAMILY MEDICINE | Facility: CLINIC | Age: 58
End: 2018-04-23

## 2018-04-23 ENCOUNTER — MYC MEDICAL ADVICE (OUTPATIENT)
Dept: FAMILY MEDICINE | Facility: CLINIC | Age: 58
End: 2018-04-23

## 2018-04-23 DIAGNOSIS — F40.240 CLAUSTROPHOBIA: Primary | ICD-10-CM

## 2018-04-23 DIAGNOSIS — R51.9 ACUTE NONINTRACTABLE HEADACHE, UNSPECIFIED HEADACHE TYPE: ICD-10-CM

## 2018-04-23 PROCEDURE — 70551 MRI BRAIN STEM W/O DYE: CPT | Performed by: RADIOLOGY

## 2018-04-23 PROCEDURE — G0328 FECAL BLOOD SCRN IMMUNOASSAY: HCPCS | Performed by: NURSE PRACTITIONER

## 2018-04-23 RX ORDER — LORAZEPAM 0.5 MG/1
TABLET ORAL
Qty: 2 TABLET | Refills: 0 | Status: SHIPPED | OUTPATIENT
Start: 2018-04-23 | End: 2018-05-31

## 2018-04-23 NOTE — TELEPHONE ENCOUNTER
Reason for call:  Symptom   Symptom or request: is unable to eat and thinks she is having a reaction to her Antibiotics. Patient reported to be having diarrhea.     Duration (how long have symptoms been present): since starting the medication yesterday morning 4/22/2018  Have you been treated for this before? Yes    Additional comments:     Phone number to reach patient:  Cell number on file:    Telephone Information:   Mobile [phone]   Best Time:  n/a    Can we leave a detailed message on this number?  YES

## 2018-04-23 NOTE — TELEPHONE ENCOUNTER
[facility] and patient was already updated by     Per patient, pharmacist advised her to take the abx on an empty stomach.  Patient reports that she has been having nausea after taking abx  She has no appetite and no taste and has not been eating.  She tried to eat a banana this morning but it had no taste so she spit this out.  She stated that she had this prior to the OV as well  Having diarrhea several times a day. 4 episodes this morning with some abd cramping    Explained that diarrhea is a possible s/e of all abx.  Patient reports that she is staying hydrated orally and diarrhea is tolerable.    Please see patient's Bizzabot message below regarding further questions    Divya Cedeno RN

## 2018-04-23 NOTE — TELEPHONE ENCOUNTER
She can take a probiotic and/or Imodium over the counter to help with the diarrhea. If having watery stool 10x/day, clear liquid stools, or symptoms of dehydration despite use of Imodium, then needs to be seen.  I reviewed her other symptoms- no changes in treatment plan. Will contact her with MRI results when available- may take 1-2 business days.    Tatum Thomas, CNP

## 2018-04-23 NOTE — TELEPHONE ENCOUNTER
Patient stated that she checked with [facility] and they still did not get the prescription  Please verify and refax if needed.    Divya Cedeno RN

## 2018-04-23 NOTE — TELEPHONE ENCOUNTER
Confirmed prescription faxed to Pharmacy. Meghna Blandon,     LORazepam (ATIVAN) 0.5 MG tablet 2 tablet 0 4/23/2018  --   Sig: Take 1-2 tablets 30 minutes prior to procedure.  Do not operate a vehicle after taking this medication   Class: Local Print     [facility] [address] [place], [place] - [address] AT [address]

## 2018-04-23 NOTE — TELEPHONE ENCOUNTER
Reason for Call:  Other prescription    Detailed comments:  Patient calling. She is scheduled this afternoon for a mri. She is claustrophobic. Please call in a pre med for her.     Call to the [facility] by her work.  [phone].     Phone Number Patient can be reached at: Cell number on file:    Telephone Information:   Mobile [phone]       Best Time:  Any     Can we leave a detailed message on this number? YES    Call taken on 4/23/2018 at 7:55 AM by Gris Sams

## 2018-04-23 NOTE — TELEPHONE ENCOUNTER
Patient notified of Provider's message as written.  Patient verbalized understanding.  Divya Cedeno RN

## 2018-04-23 NOTE — TELEPHONE ENCOUNTER
Called patient and informed her this was completed. She understood and will contact the pharmacy about . Meghna Blandon,

## 2018-04-26 ENCOUNTER — TRANSFERRED RECORDS (OUTPATIENT)
Dept: HEALTH INFORMATION MANAGEMENT | Facility: CLINIC | Age: 58
End: 2018-04-26

## 2018-04-26 NOTE — PROGRESS NOTES
SUBJECTIVE:   Teresa Mueller is a 57 year old female who presents to clinic today for the following health issues:      Chief Complaint   Patient presents with     RECHECK     Stomach and Vaginal Bleeding     Breast Problem     states ever since she had Mammogram in April she's had tenderness in right breast       Patient presents for follow up of H. Pylori infection treated with triple therapy. Continues to have left upper quadrant pain, particularly worse 20 mintues after eating. Has associated burping after drinking water. She notes some mouth dryness and white coating on tongue- wonders if this could be thrush.    Complains of vaginal dryness, pink-tinged discharge after intercourse, and dyspareunia ongoing since menopause 2 years ago.  Using silicone based lubricant with partial relief. Tried topical homeopathic progesterone without improvement.      Problem list and histories reviewed & adjusted, as indicated.  Additional history: as documented    Patient Active Problem List   Diagnosis     Tinnitus     IBS (irritable bowel syndrome)     CARDIOVASCULAR SCREENING; LDL GOAL LESS THAN 160     Advanced directives, counseling/discussion     Abnormal Pap smear of cervix     BMI 31.0-31.9,adult     Atrophic vaginitis     H. pylori infection     LUQ abdominal pain     Past Surgical History:   Procedure Laterality Date     C APPENDECTOMY       LASIK  9-00    bilat     TUBAL LIGATION         Social History   Substance Use Topics     Smoking status: Never Smoker     Smokeless tobacco: Never Used     Alcohol use Yes      Comment: OCC     Family History   Problem Relation Age of Onset     Hypertension Mother      Colon Cancer Mother 76     DIABETES Father      adult onset     CEREBROVASCULAR DISEASE Father      age 64     HEART DISEASE Father      age 65     Eye Disorder Father      GLAUCOMA     Hypertension Father      HEART DISEASE Maternal Grandmother      CHF     HEART DISEASE Maternal Grandfather      Alzheimer  "Disease Paternal Grandmother      HEART DISEASE Paternal Grandfather      HEART DISEASE Brother      aortic weak     Alcohol/Drug Sister      alcohol use and drugs use     Arthritis Brother      adult      Eye Disorder Brother      GLAUCOMA           Reviewed and updated as needed this visit by clinical staff       Reviewed and updated as needed this visit by Provider         ROS:  Constitutional, HEENT, cardiovascular, pulmonary, gi and gu systems are negative, except as otherwise noted.    OBJECTIVE:     /78 (BP Location: Left arm, Patient Position: Chair, Cuff Size: Adult Regular)  Pulse 74  Temp 97.5  F (36.4  C) (Oral)  Ht 5' 3.75\" (1.619 m)  Wt 175 lb (79.4 kg)  LMP 12/28/2014  SpO2 99%  BMI 30.27 kg/m2  Body mass index is 30.27 kg/(m^2).  GENERAL: healthy, alert and no distress  HENT: normal cephalic/atraumatic, ear canals and TM's normal, nose without ulcers or lesions, oropharynx clear, oral mucous membranes moist and white coating on tongue which scrapes off easily, no bleeding  RESP: lungs clear to auscultation - no rales, rhonchi or wheezes  CV: regular rate and rhythm, normal S1 S2, no S3 or S4, no murmur, click or rub, no peripheral edema and peripheral pulses strong  ABDOMEN: tenderness epigastric and LUQ, no organomegaly or masses and bowel sounds normal  PSYCH: anxious    Diagnostic Test Results:  Results for orders placed or performed in visit on 05/31/18 (from the past 24 hour(s))   KOH skin hair or nails   Result Value Ref Range    Specimen Description Tongue     KOH Skin Hair Nails Test No fungal elements seen    CBC with platelets differential   Result Value Ref Range    WBC 5.8 4.0 - 11.0 10e9/L    RBC Count 4.57 3.8 - 5.2 10e12/L    Hemoglobin 13.6 11.7 - 15.7 g/dL    Hematocrit 40.8 35.0 - 47.0 %    MCV 89 78 - 100 fl    MCH 29.8 26.5 - 33.0 pg    MCHC 33.3 31.5 - 36.5 g/dL    RDW 13.4 10.0 - 15.0 %    Platelet Count 260 150 - 450 10e9/L    Diff Method Automated Method     % " Neutrophils 65.5 %    % Lymphocytes 22.8 %    % Monocytes 8.1 %    % Eosinophils 3.1 %    % Basophils 0.5 %    Absolute Neutrophil 3.8 1.6 - 8.3 10e9/L    Absolute Lymphocytes 1.3 0.8 - 5.3 10e9/L    Absolute Monocytes 0.5 0.0 - 1.3 10e9/L    Absolute Eosinophils 0.2 0.0 - 0.7 10e9/L    Absolute Basophils 0.0 0.0 - 0.2 10e9/L       ASSESSMENT/PLAN:     1. LUQ abdominal pain  Likely PUD, possibly related to H. Pylori. Continue PPI for total of 8 weeks, add Carafate. If symptoms not improving in 1-2 months, will proceed with endoscopy.    - CBC with platelets differential  - omeprazole (PRILOSEC) 20 MG CR capsule; Take 1 capsule (20 mg) by mouth 2 times daily  Dispense: 60 capsule; Refill: 1  - sucralfate (CARAFATE) 1 GM tablet; Take 1 tablet (1 g) by mouth 4 times daily  Dispense: 40 tablet; Refill: 1    2. H. pylori infection  As above  - omeprazole (PRILOSEC) 20 MG CR capsule; Take 1 capsule (20 mg) by mouth 2 times daily  Dispense: 60 capsule; Refill: 1    3. Thrush, oral  Unclear to me if this is truly thrush- will confirm with KOH  - KOH skin hair or nails    4. Atrophic vaginitis  Offered trial of topical estrogen and patient agreeable.   - COMPOUNDED NON-CONTROLLED SUBSTANCE (CMPD RX) - PHARMACY TO MIX COMPOUNDED MEDICATION; Apply topically daily for 1 week, then reduce to twice weekly  Dispense: 30 g; Refill: 11      BELINDA Downs [facility]    Patient Instructions     You could try Good Clean Love or Uber Lube for lubricant during intercourse.    [facility]    If you have any questions regarding to your visit please contact your care team:       Team Red:   Clinic Hours Telephone Number   Dr. Junie Thomas, NP   7am-7pm  Monday - Thursday   7am-5pm  Fridays  [phone]  (Appointment scheduling available 24/7)    Questions about your recent visit?   Team Line  [phone]   Urgent Care - [place] and  [place] - 11am-9pm Monday-Friday Saturday-Sunday- 9am-5pm   [place] - 5pm-9pm Monday-Friday Saturday-Sunday- 9am-5pm  [phone] - Janet Ceballos  [phone] - [place]       What options do I have for a visit other than an office visit? We offer electronic visits (e-visits) and telephone visits, when medically appropriate.  Please check with your medical insurance to see if these types of visits are covered, as you will be responsible for any charges that are not paid by your insurance.      You can use Mondokio (secure electronic communication) to access to your chart, send your primary care provider a message, or make an appointment. Ask a team member how to get started.     For a price quote for your services, please call our Consumer Price Line at [phone] or our Imaging Cost estimation line at [phone] (for imaging tests).    Discharged by Angela Sheets MA.

## 2018-04-28 LAB — HEMOCCULT STL QL IA: NEGATIVE

## 2018-04-28 NOTE — TELEPHONE ENCOUNTER
4/28/2018    Call Regarding Preventive Health Screening Colonoscopy    Attempt 3    Message on voicemail    Comments:       Outreach   Taylor Dalton

## 2018-04-30 ENCOUNTER — MYC MEDICAL ADVICE (OUTPATIENT)
Dept: FAMILY MEDICINE | Facility: CLINIC | Age: 58
End: 2018-04-30

## 2018-04-30 DIAGNOSIS — Z12.11 SPECIAL SCREENING FOR MALIGNANT NEOPLASMS, COLON: ICD-10-CM

## 2018-05-01 NOTE — TELEPHONE ENCOUNTER
Tatum,   Please see dineout message and attached pictures.   RN was unable to locate any Candida in patient file.   Please advise if any action needs to be taken.     Yvonne Garcia RN

## 2018-05-31 ENCOUNTER — OFFICE VISIT (OUTPATIENT)
Dept: FAMILY MEDICINE | Facility: CLINIC | Age: 58
End: 2018-05-31
Payer: COMMERCIAL

## 2018-05-31 ENCOUNTER — TELEPHONE (OUTPATIENT)
Dept: FAMILY MEDICINE | Facility: CLINIC | Age: 58
End: 2018-05-31

## 2018-05-31 VITALS
HEIGHT: 64 IN | OXYGEN SATURATION: 99 % | DIASTOLIC BLOOD PRESSURE: 78 MMHG | WEIGHT: 175 LBS | HEART RATE: 74 BPM | TEMPERATURE: 97.5 F | BODY MASS INDEX: 29.88 KG/M2 | SYSTOLIC BLOOD PRESSURE: 114 MMHG

## 2018-05-31 DIAGNOSIS — N95.2 ATROPHIC VAGINITIS: ICD-10-CM

## 2018-05-31 DIAGNOSIS — R10.12 LUQ ABDOMINAL PAIN: Primary | ICD-10-CM

## 2018-05-31 DIAGNOSIS — A04.8 H. PYLORI INFECTION: ICD-10-CM

## 2018-05-31 DIAGNOSIS — B37.0 THRUSH, ORAL: ICD-10-CM

## 2018-05-31 LAB
BASOPHILS # BLD AUTO: 0 10E9/L (ref 0–0.2)
BASOPHILS NFR BLD AUTO: 0.5 %
DIFFERENTIAL METHOD BLD: NORMAL
EOSINOPHIL # BLD AUTO: 0.2 10E9/L (ref 0–0.7)
EOSINOPHIL NFR BLD AUTO: 3.1 %
ERYTHROCYTE [DISTWIDTH] IN BLOOD BY AUTOMATED COUNT: 13.4 % (ref 10–15)
HCT VFR BLD AUTO: 40.8 % (ref 35–47)
HGB BLD-MCNC: 13.6 G/DL (ref 11.7–15.7)
KOH PREP SPEC: NORMAL
LYMPHOCYTES # BLD AUTO: 1.3 10E9/L (ref 0.8–5.3)
LYMPHOCYTES NFR BLD AUTO: 22.8 %
MCH RBC QN AUTO: 29.8 PG (ref 26.5–33)
MCHC RBC AUTO-ENTMCNC: 33.3 G/DL (ref 31.5–36.5)
MCV RBC AUTO: 89 FL (ref 78–100)
MONOCYTES # BLD AUTO: 0.5 10E9/L (ref 0–1.3)
MONOCYTES NFR BLD AUTO: 8.1 %
NEUTROPHILS # BLD AUTO: 3.8 10E9/L (ref 1.6–8.3)
NEUTROPHILS NFR BLD AUTO: 65.5 %
PLATELET # BLD AUTO: 260 10E9/L (ref 150–450)
RBC # BLD AUTO: 4.57 10E12/L (ref 3.8–5.2)
SPECIMEN SOURCE: NORMAL
WBC # BLD AUTO: 5.8 10E9/L (ref 4–11)

## 2018-05-31 PROCEDURE — 87220 TISSUE EXAM FOR FUNGI: CPT | Performed by: NURSE PRACTITIONER

## 2018-05-31 PROCEDURE — 85025 COMPLETE CBC W/AUTO DIFF WBC: CPT | Performed by: NURSE PRACTITIONER

## 2018-05-31 PROCEDURE — 36415 COLL VENOUS BLD VENIPUNCTURE: CPT | Performed by: NURSE PRACTITIONER

## 2018-05-31 PROCEDURE — 99214 OFFICE O/P EST MOD 30 MIN: CPT | Performed by: NURSE PRACTITIONER

## 2018-05-31 RX ORDER — SUCRALFATE 1 G/1
1 TABLET ORAL 4 TIMES DAILY
Qty: 40 TABLET | Refills: 1 | Status: SHIPPED | OUTPATIENT
Start: 2018-05-31 | End: 2018-07-17

## 2018-05-31 ASSESSMENT — PAIN SCALES - GENERAL: PAINLEVEL: MODERATE PAIN (4)

## 2018-05-31 NOTE — PATIENT INSTRUCTIONS
You could try Good Clean Love or Uber Lube for lubricant during intercourse.    [facility]    If you have any questions regarding to your visit please contact your care team:       Team Red:   Clinic Hours Telephone Number   Dr. Junie Thomas, NP   7am-7pm  Monday - Thursday   7am-5pm  Fridays  [phone]  (Appointment scheduling available 24/7)    Questions about your recent visit?   Team Line  [phone]   Urgent Care - [place] and [facility] - 11am-9pm Monday-Friday Saturday-Sunday- 9am-5pm   [place] - 5pm-9pm Monday-Friday Saturday-Sunday- 9am-5pm  [phone] - [place]  [phone] - [place]       What options do I have for a visit other than an office visit? We offer electronic visits (e-visits) and telephone visits, when medically appropriate.  Please check with your medical insurance to see if these types of visits are covered, as you will be responsible for any charges that are not paid by your insurance.      You can use Virobay (secure electronic communication) to access to your chart, send your primary care provider a message, or make an appointment. Ask a team member how to get started.     For a price quote for your services, please call our Consumer Price Line at [phone] or our Imaging Cost estimation line at [phone] (for imaging tests).    Discharged by Angela Sheets MA.

## 2018-05-31 NOTE — TELEPHONE ENCOUNTER
Routing to provider to advise.    Geneva Daugherty RN  [facility]

## 2018-05-31 NOTE — MR AVS SNAPSHOT
After Visit Summary   5/31/2018    Teresa SMITH [place]    MRN: [id number]           Patient Information     Date Of Birth          1960        Visit Information        Provider Department      5/31/2018 12:00 PM Tatum Thomas APRN [facility]        Today's Diagnoses     LUQ abdominal pain    -  1    H. pylori infection        Thrush, oral        Atrophic vaginitis          Care Instructions    You could try Good Clean Love or Uber Lube for lubricant during intercourse.    [facility]    If you have any questions regarding to your visit please contact your care team:       Team Red:   Clinic Hours Telephone Number   Dr. Junie Thomas, NP   7am-7pm  Monday - Thursday   7am-5pm  Fridays  [phone]  (Appointment scheduling available 24/7)    Questions about your recent visit?   Team Line  [phone]   Urgent Care - [place] and [facility] - 11am-9pm Monday-Friday Saturday-Sunday- 9am-5pm   [place] - 5pm-9pm Monday-Friday Saturday-Sunday- 9am-5pm  [phone] - [place]  [phone] - [place]       What options do I have for a visit other than an office visit? We offer electronic visits (e-visits) and telephone visits, when medically appropriate.  Please check with your medical insurance to see if these types of visits are covered, as you will be responsible for any charges that are not paid by your insurance.      You can use iHealthHome (secure electronic communication) to access to your chart, send your primary care provider a message, or make an appointment. Ask a team member how to get started.     For a price quote for your services, please call our Consumer Price Line at [phone] or our Imaging Cost estimation line at [phone] (for imaging tests).    Discharged by Angela Sheets MA.            Follow-ups after your visit        Follow-up notes from your care team      Return in about 4 weeks (around 6/28/2018) for Recheck stomach pain.      Your next 10 appointments already scheduled     Jun 28, 2018 12:00 PM CDT   Office Visit with BELINDA Downs CNP   [facility] ([facility])    [address]  [place] [address] [phone]           Bring a current list of meds and any records pertaining to this visit. For Physicals, please bring immunization records and any forms needing to be filled out. Please arrive 10 minutes early to complete paperwork.              Who to contact     If you have questions or need follow up information about today's clinic visit or your schedule please contact [facility] directly at [phone].  Normal or non-critical lab and imaging results will be communicated to you by MyChart, letter or phone within 4 business days after the clinic has received the results. If you do not hear from us within 7 days, please contact the clinic through MyChart or phone. If you have a critical or abnormal lab result, we will notify you by phone as soon as possible.  Submit refill requests through Receptor or call your pharmacy and they will forward the refill request to us. Please allow 3 business days for your refill to be completed.          Additional Information About Your Visit        MyCharQuantopian Information     Receptor gives you secure access to your electronic health record. If you see a primary care provider, you can also send messages to your care team and make appointments. If you have questions, please call your primary care clinic.  If you do not have a primary care provider, please call [phone] and they will assist you.        Care EveryWhere ID     This is your Care EveryWhere ID. This could be used by other organizations to access your [place] medical records  [id number]        Your Vitals Were     Pulse Temperature Height Last Period Pulse Oximetry BMI (Body Mass Index)    74 97.5  F  "(36.4  C) (Oral) 5' 3.75\" (1.619 m) 12/28/2014 99% 30.27 kg/m2       Blood Pressure from Last 3 Encounters:   05/31/18 114/78   04/20/18 130/88   12/18/17 124/82    Weight from Last 3 Encounters:   05/31/18 175 lb (79.4 kg)   04/20/18 186 lb (84.4 kg)   12/18/17 179 lb 6.4 oz (81.4 kg)              We Performed the Following     CBC with platelets differential     KOH skin hair or nails          Today's Medication Changes          These changes are accurate as of 5/31/18  1:01 PM.  If you have any questions, ask your nurse or doctor.               Start taking these medicines.        Dose/Directions    COMPOUNDED NON-CONTROLLED SUBSTANCE - PHARMACY TO MIX COMPOUNDED MEDICATION   Commonly known as:  CMPD RX   Used for:  Atrophic vaginitis   Started by:  Tatum Thomas APRN CNP        Apply topically daily for 1 week, then reduce to twice weekly   Quantity:  30 g   Refills:  11       sucralfate 1 GM tablet   Commonly known as:  CARAFATE   Used for:  LUQ abdominal pain   Started by:  Tatum Thomas APRN CNP        Dose:  1 g   Take 1 tablet (1 g) by mouth 4 times daily   Quantity:  40 tablet   Refills:  1            Where to get your medicines      These medications were sent to [facility] [address] [place] [address]  [address] [place] [address]     Phone:  [phone]     COMPOUNDED NON-CONTROLLED SUBSTANCE - PHARMACY TO MIX COMPOUNDED MEDICATION         These medications were sent to [facility] [address] [address] AT SEC OF [facility]  [address] [place] [address]     Phone:  [phone]     omeprazole 20 MG CR capsule    sucralfate 1 GM tablet                Primary Care Provider Office Phone # Fax #    BELINDA Downs [phone] [address] [facility] [address]        Equal Access to Services     DERECK NICHOLS AH: ericka Orlando, " efra gerardellyn mamiyumiko willis pbin hayaan adeeg kharash la'aan ah. So [facility] [phone].    ATENCIÓN: Si emely ov, tiene a houser disposición servicios gratuitos de asistencia lingüística. Anton al [phone].    We comply with applicable federal civil rights laws and [place] laws. We do not discriminate on the basis of race, color, national origin, age, disability, sex, sexual orientation, or gender identity.            Thank you!     [facility] you for choosing [facility]  for your care. Our goal is always to provide you with excellent care. Hearing back from our patients is one way we can continue to improve our services. Please take a few minutes to complete the written survey that you may receive in the mail after your visit with us. Thank you!             Your Updated Medication List - Protect others around you: Learn how to safely use, store and throw away your medicines at www.disposemymeds.org.          This list is accurate as of 5/31/18  1:01 PM.  Always use your most recent med list.                   Brand Name Dispense Instructions for use Diagnosis    COMPOUNDED NON-CONTROLLED SUBSTANCE - PHARMACY TO MIX COMPOUNDED MEDICATION    CMPD RX    30 g    Apply topically daily for 1 week, then reduce to twice weekly    Atrophic vaginitis       MULTI-VITAMIN PO       Routine general medical examination at a health care facility       omeprazole 20 MG CR capsule    priLOSEC    60 capsule    Take 1 capsule (20 mg) by mouth 2 times daily    H. pylori infection, LUQ abdominal pain       sucralfate 1 GM tablet    CARAFATE    40 tablet    Take 1 tablet (1 g) by mouth 4 times daily    LUQ abdominal pain       VITAMIN C PO      Take by mouth daily        VITAMIN D PO      Take 5,000 Units by mouth daily

## 2018-05-31 NOTE — TELEPHONE ENCOUNTER
Called and spoke with Martine at [facility]. Gave her provider's message as written.    Geneva Daugherty RN  [facility] [place]

## 2018-05-31 NOTE — TELEPHONE ENCOUNTER
Reason for Call:  Other prescription    Detailed comments: Pharmacy asking for return call to clarify the prescription for the Estradiol cream. They are needing the dose of what patient should be applying ( how many grams) and if patient is to be applying topically or vaginally. Please contact pharmacy to clarify.    Phone Number Patient can be reached at: Other phone number:  [phone]    Best Time: any time    Can we leave a detailed message on this number? YES    Call taken on 5/31/2018 at 1:56 PM by Myla White

## 2018-07-17 ENCOUNTER — OFFICE VISIT (OUTPATIENT)
Dept: OBGYN | Facility: CLINIC | Age: 58
End: 2018-07-17
Payer: COMMERCIAL

## 2018-07-17 VITALS
BODY MASS INDEX: 29.94 KG/M2 | WEIGHT: 175.4 LBS | SYSTOLIC BLOOD PRESSURE: 136 MMHG | DIASTOLIC BLOOD PRESSURE: 76 MMHG | TEMPERATURE: 98.2 F | HEIGHT: 64 IN

## 2018-07-17 DIAGNOSIS — N95.2 ATROPHIC VAGINITIS: ICD-10-CM

## 2018-07-17 DIAGNOSIS — R14.0 ABDOMINAL BLOATING: ICD-10-CM

## 2018-07-17 DIAGNOSIS — A04.8 H. PYLORI INFECTION: ICD-10-CM

## 2018-07-17 DIAGNOSIS — R35.0 URINARY FREQUENCY: Primary | ICD-10-CM

## 2018-07-17 DIAGNOSIS — N93.0 POSTCOITAL BLEEDING: ICD-10-CM

## 2018-07-17 DIAGNOSIS — N64.4 MASTALGIA: ICD-10-CM

## 2018-07-17 DIAGNOSIS — R43.9 DISTURBANCES OF SENSATION OF SMELL AND TASTE: ICD-10-CM

## 2018-07-17 DIAGNOSIS — R53.83 FATIGUE, UNSPECIFIED TYPE: ICD-10-CM

## 2018-07-17 LAB
ALBUMIN UR-MCNC: NEGATIVE MG/DL
APPEARANCE UR: CLEAR
BILIRUB UR QL STRIP: NEGATIVE
COLOR UR AUTO: YELLOW
GLUCOSE UR STRIP-MCNC: NEGATIVE MG/DL
HGB UR QL STRIP: NEGATIVE
KETONES UR STRIP-MCNC: NEGATIVE MG/DL
LEUKOCYTE ESTERASE UR QL STRIP: NEGATIVE
LIPASE SERPL-CCNC: 91 U/L (ref 73–393)
NITRATE UR QL: NEGATIVE
PH UR STRIP: 7 PH (ref 5–7)
SOURCE: NORMAL
SP GR UR STRIP: 1.01 (ref 1–1.03)
UROBILINOGEN UR STRIP-ACNC: 0.2 EU/DL (ref 0.2–1)

## 2018-07-17 PROCEDURE — 82306 VITAMIN D 25 HYDROXY: CPT | Performed by: OBSTETRICS & GYNECOLOGY

## 2018-07-17 PROCEDURE — 36415 COLL VENOUS BLD VENIPUNCTURE: CPT | Performed by: OBSTETRICS & GYNECOLOGY

## 2018-07-17 PROCEDURE — 84443 ASSAY THYROID STIM HORMONE: CPT | Performed by: OBSTETRICS & GYNECOLOGY

## 2018-07-17 PROCEDURE — 83690 ASSAY OF LIPASE: CPT | Performed by: OBSTETRICS & GYNECOLOGY

## 2018-07-17 PROCEDURE — 81003 URINALYSIS AUTO W/O SCOPE: CPT | Performed by: OBSTETRICS & GYNECOLOGY

## 2018-07-17 PROCEDURE — 82150 ASSAY OF AMYLASE: CPT | Performed by: OBSTETRICS & GYNECOLOGY

## 2018-07-17 PROCEDURE — 99204 OFFICE O/P NEW MOD 45 MIN: CPT | Performed by: OBSTETRICS & GYNECOLOGY

## 2018-07-17 NOTE — Clinical Note
Kevin Preciado,  This patient feels like she has a systemic yeast flare up after taking abx for h pylori.  Do you know a good probiotic brand that she could take? She has tried a certain kind and did not feel it was useful, but I think she needs a good one. Thanks for your expertise~ Christina

## 2018-07-17 NOTE — PROGRESS NOTES
"Chief Complaint   Patient presents with     Abdominal Pain     Urinary Problem     Breast Problem     Bloated       Initial /76 (BP Location: Right arm, Patient Position: Sitting, Cuff Size: Adult Regular)  Temp 98.2  F (36.8  C) (Oral)  Ht 5' 3.75\" (1.619 m)  Wt 175 lb 6.4 oz (79.6 kg)  LMP 2014  BMI 30.34 kg/m2 Estimated body mass index is 30.34 kg/(m^2) as calculated from the following:    Height as of this encounter: 5' 3.75\" (1.619 m).    Weight as of this encounter: 175 lb 6.4 oz (79.6 kg).  BP completed using cuff size: regular        The following HM Due: NONE      The following patient reported/Care Every where data was sent to:  P ABSTRACT QUALITY INITIATIVES [16260]  N/a       n/a and patient has appointment for today            CC:  Multiple concerns   HPI:  Teresa Mueller is a 57 year old post menopausal  here for a consultation regarding:    \"I have not been feeling well for awhile\"   A lot of her issues started this past February.  She is not sure what changed in February but she developed a lot of different symptoms which she has seen a couple different providers for.   Was diagnosed with h pylori in early April by doing a home h pylori test.  Patient took 2 different abx and proton pump inhibitor.  That helped some, but still did not resolve her symptoms.   She tried a naturopathic route in which she only drank water for 2 weeks, and did not eat.  Following that, she went on a [place] sprouMobile Pulse diet. She felt like after the abx she got systemic symptoms of yeast flare up -- dizzy spells, brain fog.    (had that before when she was in the ) so recognized the symptoms.   Now she is avoiding sugar and fruit to treat her yeast flare.   She is a vegetarian.  Eats some fish and turkey.   She used to feel really well and run marathons, but  after menopause (has not had a period for 2 yrs) has not felt well.   Wondering if her pancreas is off b/c she has not been " able to taste salt, sugar or coffee since February.  She looked that up online and those are symptoms of pancreatic illness.   Worried she might have ovarian cancer since she is describing some ov cancer symptoms.     Stable relationship x 10 yrs.   Prev  - suicide was single for 10 yrs and now with a stable partner.   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   After intercourse  she gets a pink vaginal discharge < tsp.   No hot flashes, had at first for 6 months then stopped.   Breast tenderness also started -- just the nipples karmen the right side  Feels weak and tired, joints hurt, I can't sleep at night, muscle cramps .   Leg cramps, bowel changes, dry mouth    Takes vit C, D and multivitamin daily,  occ cod live oil       Wt Readings from Last 4 Encounters:   07/17/18 175 lb 6.4 oz (79.6 kg)   05/31/18 175 lb (79.4 kg)   04/20/18 186 lb (84.4 kg)   12/18/17 179 lb 6.4 oz (81.4 kg)         Patient's last menstrual period was 12/28/2014.           Past GYN history:   Lab Results   Component Value Date    PAP NIL 12/18/2017    PAP NIL 12/19/2014    PAP NIL 12/23/2011           Past Medical History:   Diagnosis Date     Ankle fracture     left     Heart murmur      IBS (irritable bowel syndrome)     age 24 years     MEDICAL HISTORY OF -     CRACKED TOOHT ROOT(FRONT RIGHT)     Menorrhagia      Obesity      Tinnitus 1984           Past Surgical History:   Procedure Laterality Date     C APPENDECTOMY       LASIK  9-00    bilat     TUBAL LIGATION         Family History   Problem Relation Age of Onset     Hypertension Mother      Colon Cancer Mother 76     Tumor Mother      on pituritary     Diabetes Father      adult onset     Cerebrovascular Disease  "Father      age 64     HEART DISEASE Father      age 65     Eye Disorder Father      GLAUCOMA     Hypertension Father      HEART DISEASE Maternal Grandmother      CHF     HEART DISEASE Maternal Grandfather      Alzheimer Disease Paternal Grandmother      HEART DISEASE Paternal Grandfather      HEART DISEASE Brother      aortic weak     Alcohol/Drug Sister      alcohol use and drugs use     Arthritis Brother      adult      Eye Disorder Brother      GLAUCOMA        Medications:    Current Outpatient Prescriptions:      Ascorbic Acid (VITAMIN C PO), Take by mouth daily, Disp: , Rfl:      Cholecalciferol (VITAMIN D PO), Take 5,000 Units by mouth daily, Disp: , Rfl:      Multiple Vitamin (MULTI-VITAMIN PO), , Disp: , Rfl:     Allergies:  Sulfa drugs and Yeast       EXAM:  Blood pressure 136/76, temperature 98.2  F (36.8  C), temperature source Oral, height 5' 3.75\" (1.619 m), weight 175 lb 6.4 oz (79.6 kg), last menstrual period 12/28/2014, not currently breastfeeding.  BMI= Body mass index is 30.34 kg/(m^2).  Patient's last menstrual period was 12/28/2014.  General - pleasant female in no acute distress.  Neurological - alert and oriented X 3  Psychiatric - normal mood and affect    No other physical examination was performed today as we spent over 50% of today's 45 minute visit in face-to-face discussion and counseling about all of the issues noted in the assessment.       ASSESSMENT:  Encounter Diagnoses   Name Primary?     Urinary frequency Yes     Abdominal bloating      Postcoital bleeding      Atrophic vaginitis      Fatigue, unspecified type      H. pylori infection      Disturbances of sensation of smell and taste      Mastalgia         PLAN:   After reviewing all of her multiple symptoms in detail and discussing her diet which is actually quite healthy other than she may lack in protein,  we decided to obtain a pelvic ultrasound for postcoital spotting and to rule out ovarian cancer.    We will obtain labs as " "per orders including amylase and lipase to rule out pancreatic disease.      H pylori stool sample    I recommended that she increase the protein in her diet    if this workup proves to be completely negative,  we could consider treating her with a low-dose estrogen and progesterone as a lot of her symptoms started around the onset of menopause.  Hormones would possibly help with her quality of life, her sleep patterns.  I also discussed with her the use of probiotics and obtained additional information from Ozzy Tang in that regard      Christina Nolan MD      I messaged Ozzy Tang regarding the best probiotics and here is what he said:     Floragen 3 at our pharmacy next door is good. I'd also have her pair that with a low sugar diet, kefir, kimchi (korean cabbage - you can get everywhere).     I'm seeing the alternative medicine folks try to pair fermented foods with their probiotics as they find they work better.     The \"systemic yeast\" controversy is one that patients will fixate on. I tell patients they don't have systemic yeast but could probably have some mild Small Intestinal Fungal Overgrowth which their bodies might be a bit sensitized to causing fatigue, brain fog, bloating, sugar cravings, etc. We don't have good tests for it, obviously.     Here's a good study:   Https://www.ncbi.nlm.nih.gov/pubmed/86238461     I will sometimes put those patients on Nystatin 500,000 units TID x 10-14 days and have them repeat it a few weeks later. It is not systemically absorbed and rarely causes issues for patients.     Thanks.   Ozzy   I called patient and shared this info with her July 24, 2018. Christina Nolan MD     "

## 2018-07-17 NOTE — LETTER
July 18, 2018      Teresa Mueller   [address] [address]  [place] [address]        Dear ,    We are writing to inform you of your test results.    Your test results fall within the expected range(s) or remain unchanged from previous results.  Please continue with current treatment plan.    Resulted Orders   UA reflex to Microscopic and Culture   Result Value Ref Range    Color Urine Yellow     Appearance Urine Clear     Glucose Urine Negative NEG^Negative mg/dL    Bilirubin Urine Negative NEG^Negative    Ketones Urine Negative NEG^Negative mg/dL    Specific Gravity Urine 1.015 1.003 - 1.035    Blood Urine Negative NEG^Negative    pH Urine 7.0 5.0 - 7.0 pH    Protein Albumin Urine Negative NEG^Negative mg/dL    Urobilinogen Urine 0.2 0.2 - 1.0 EU/dL    Nitrite Urine Negative NEG^Negative    Leukocyte Esterase Urine Negative NEG^Negative    Source Midstream Urine    Vitamin D Deficiency   Result Value Ref Range    Vitamin D Deficiency screening 42 20 - 75 ug/L      Comment:      Season, race, dietary intake, and treatment affect the concentration of   25-hydroxy-Vitamin D. Values may decrease during winter months and increase   during summer months. Values 20-29 ug/L may indicate Vitamin D insufficiency   and values <20 ug/L may indicate Vitamin D deficiency.  Vitamin D determination is routinely performed by an immunoassay specific for   25 hydroxyvitamin D3.  If an individual is on vitamin D2 (ergocalciferol)   supplementation, please specify 25 OH vitamin D2 and D3 level determination by   LCMSMS test VITD23.     TSH with free T4 reflex   Result Value Ref Range    TSH 1.06 0.40 - 4.00 mU/L   Amylase   Result Value Ref Range    Amylase 54 30 - 110 U/L   Lipase   Result Value Ref Range    Lipase 91 73 - 393 U/L       If you have any questions or concerns, please call the clinic at the number listed above.       Sincerely,        Christina Nolan MD

## 2018-07-17 NOTE — MR AVS SNAPSHOT
"              After Visit Summary   7/17/2018    Teresa Mueller    MRN: [id number]           Patient Information     Date Of Birth          1960        Visit Information        Provider Department      7/17/2018 9:30 AM Christina Nolan MD [facility]        Today's Diagnoses     Urinary frequency    -  1    Abdominal bloating        Postcoital bleeding        Atrophic vaginitis        Fatigue, unspecified type        H. pylori infection        Disturbances of sensation of smell and taste        Mastalgia           Follow-ups after your visit        Who to contact     If you have questions or need follow up information about today's clinic visit or your schedule please contact [facility] directly at [phone].  Normal or non-critical lab and imaging results will be communicated to you by MyChart, letter or phone within 4 business days after the clinic has received the results. If you do not hear from us within 7 days, please contact the clinic through MyChart or phone. If you have a critical or abnormal lab result, we will notify you by phone as soon as possible.  Submit refill requests through CommitChange or call your pharmacy and they will forward the refill request to us. Please allow 3 business days for your refill to be completed.          Additional Information About Your Visit        MyChart Information     CommitChange lets you send messages to your doctor, view your test results, renew your prescriptions, schedule appointments and more. To sign up, go to [email or web address] . Click on \"Log in\" on the left side of the screen, which will take you to the Welcome page. Then click on \"Sign up Now\" on the right side of the page.     You will be asked to enter the access code listed below, as well as some personal information. Please follow the directions to create your username and password.     Your access code is: [email or web address]  Expires: 9/27/2018 11:08 AM     Your " "access code will  in 90 days. If you need help or a new code, please call your [place] clinic or [phone].        Care EveryWhere ID     This is your Care EveryWhere ID. This could be used by other organizations to access your [place] medical records  [id number]        Your Vitals Were     Temperature Height Last Period BMI (Body Mass Index)          98.2  F (36.8  C) (Oral) 5' 3.75\" (1.619 m) 2014 30.34 kg/m2         Blood Pressure from Last 3 Encounters:   18 136/76   18 114/78   18 130/88    Weight from Last 3 Encounters:   18 175 lb 6.4 oz (79.6 kg)   18 175 lb (79.4 kg)   18 186 lb (84.4 kg)              We Performed the Following     Amylase     Lipase     TSH with free T4 reflex     UA reflex to Microscopic and Culture     Vitamin D Deficiency          Today's Medication Changes          These changes are accurate as of 18 11:59 PM.  If you have any questions, ask your nurse or doctor.               Stop taking these medicines if you haven't already. Please contact your care team if you have questions.     COMPOUNDED NON-CONTROLLED SUBSTANCE - PHARMACY TO MIX COMPOUNDED MEDICATION   Commonly known as:  CMPD RX   Stopped by:  Christina Nolan MD           omeprazole 20 MG CR capsule   Commonly known as:  priLOSEC   Stopped by:  Christina Nolan MD           sucralfate 1 GM tablet   Commonly known as:  CARAFATE   Stopped by:  Christina Nolan MD                    Primary Care Provider Office Phone # Fax #    Tatum Anamaria Thomas, APRN [phone] [address] [facility] [address]        Equal Access to Services     [facility] AH: Hadii rocael Dos Santos, waaxda luqadaha, qaybta kaalmada marga, yumiko hogan. So [facility] [phone].    ATENCIÓN: Si habla español, tiene a houser disposición servicios gratuitos de asistencia lingüística. Llame al [phone].    We comply with " applicable federal civil rights laws and [place] laws. We do not discriminate on the basis of race, color, national origin, age, disability, sex, sexual orientation, or gender identity.            Thank you!     Thank you for choosing [facility]  for your care. Our goal is always to provide you with excellent care. Hearing back from our patients is one way we can continue to improve our services. Please take a few minutes to complete the written survey that you may receive in the mail after your visit with us. Thank you!             Your Updated Medication List - Protect others around you: Learn how to safely use, store and throw away your medicines at www.disposemymeds.org.          This list is accurate as of 7/17/18 11:59 PM.  Always use your most recent med list.                   Brand Name Dispense Instructions for use Diagnosis    MULTI-VITAMIN PO       Routine general medical examination at a health care facility       VITAMIN C PO      Take by mouth daily        VITAMIN D PO      Take 5,000 Units by mouth daily

## 2018-07-18 PROBLEM — N64.4 MASTALGIA: Status: ACTIVE | Noted: 2018-07-18

## 2018-07-18 LAB
AMYLASE SERPL-CCNC: 54 U/L (ref 30–110)
DEPRECATED CALCIDIOL+CALCIFEROL SERPL-MC: 42 UG/L (ref 20–75)
TSH SERPL DL<=0.005 MIU/L-ACNC: 1.06 MU/L (ref 0.4–4)

## 2018-07-20 ENCOUNTER — RADIANT APPOINTMENT (OUTPATIENT)
Dept: ULTRASOUND IMAGING | Facility: CLINIC | Age: 58
End: 2018-07-20
Attending: OBSTETRICS & GYNECOLOGY
Payer: COMMERCIAL

## 2018-07-20 DIAGNOSIS — R14.0 ABDOMINAL BLOATING: ICD-10-CM

## 2018-07-20 DIAGNOSIS — N93.0 POSTCOITAL BLEEDING: ICD-10-CM

## 2018-07-20 PROCEDURE — 76830 TRANSVAGINAL US NON-OB: CPT | Performed by: OBSTETRICS & GYNECOLOGY

## 2019-03-04 ENCOUNTER — OFFICE VISIT (OUTPATIENT)
Dept: FAMILY MEDICINE | Facility: CLINIC | Age: 59
End: 2019-03-04
Payer: COMMERCIAL

## 2019-03-04 VITALS
OXYGEN SATURATION: 97 % | HEART RATE: 97 BPM | DIASTOLIC BLOOD PRESSURE: 86 MMHG | SYSTOLIC BLOOD PRESSURE: 137 MMHG | BODY MASS INDEX: 32.1 KG/M2 | HEIGHT: 64 IN | TEMPERATURE: 96.8 F | WEIGHT: 188 LBS

## 2019-03-04 DIAGNOSIS — H93.13 TINNITUS, BILATERAL: ICD-10-CM

## 2019-03-04 DIAGNOSIS — R42 VERTIGO: ICD-10-CM

## 2019-03-04 DIAGNOSIS — R51.9 ACUTE NONINTRACTABLE HEADACHE, UNSPECIFIED HEADACHE TYPE: Primary | ICD-10-CM

## 2019-03-04 LAB
ANION GAP SERPL CALCULATED.3IONS-SCNC: 8 MMOL/L (ref 3–14)
BUN SERPL-MCNC: 14 MG/DL (ref 7–30)
CALCIUM SERPL-MCNC: 9.6 MG/DL (ref 8.5–10.1)
CHLORIDE SERPL-SCNC: 107 MMOL/L (ref 94–109)
CO2 SERPL-SCNC: 26 MMOL/L (ref 20–32)
CREAT SERPL-MCNC: 0.9 MG/DL (ref 0.52–1.04)
ERYTHROCYTE [DISTWIDTH] IN BLOOD BY AUTOMATED COUNT: 14 % (ref 10–15)
ERYTHROCYTE [SEDIMENTATION RATE] IN BLOOD BY WESTERGREN METHOD: 9 MM/H (ref 0–30)
GFR SERPL CREATININE-BSD FRML MDRD: 70 ML/MIN/{1.73_M2}
GLUCOSE SERPL-MCNC: 101 MG/DL (ref 70–99)
HCT VFR BLD AUTO: 42.8 % (ref 35–47)
HGB BLD-MCNC: 13.8 G/DL (ref 11.7–15.7)
MCH RBC QN AUTO: 27.7 PG (ref 26.5–33)
MCHC RBC AUTO-ENTMCNC: 32.2 G/DL (ref 31.5–36.5)
MCV RBC AUTO: 86 FL (ref 78–100)
PLATELET # BLD AUTO: 275 10E9/L (ref 150–450)
POTASSIUM SERPL-SCNC: 4.3 MMOL/L (ref 3.4–5.3)
RBC # BLD AUTO: 4.99 10E12/L (ref 3.8–5.2)
SODIUM SERPL-SCNC: 141 MMOL/L (ref 133–144)
WBC # BLD AUTO: 5.2 10E9/L (ref 4–11)

## 2019-03-04 PROCEDURE — 86038 ANTINUCLEAR ANTIBODIES: CPT | Performed by: FAMILY MEDICINE

## 2019-03-04 PROCEDURE — 85027 COMPLETE CBC AUTOMATED: CPT | Performed by: FAMILY MEDICINE

## 2019-03-04 PROCEDURE — 80048 BASIC METABOLIC PNL TOTAL CA: CPT | Performed by: FAMILY MEDICINE

## 2019-03-04 PROCEDURE — 86618 LYME DISEASE ANTIBODY: CPT | Performed by: FAMILY MEDICINE

## 2019-03-04 PROCEDURE — 85652 RBC SED RATE AUTOMATED: CPT | Performed by: FAMILY MEDICINE

## 2019-03-04 PROCEDURE — 99214 OFFICE O/P EST MOD 30 MIN: CPT | Performed by: FAMILY MEDICINE

## 2019-03-04 PROCEDURE — 36415 COLL VENOUS BLD VENIPUNCTURE: CPT | Performed by: FAMILY MEDICINE

## 2019-03-04 RX ORDER — PREDNISONE 20 MG/1
20 TABLET ORAL 2 TIMES DAILY
Qty: 14 TABLET | Refills: 0 | Status: SHIPPED | OUTPATIENT
Start: 2019-03-04 | End: 2019-03-11

## 2019-03-04 ASSESSMENT — MIFFLIN-ST. JEOR: SCORE: 1413.79

## 2019-03-04 NOTE — PROGRESS NOTES
SUBJECTIVE:   Teresa Mueller is a 58 year old female who presents to clinic today for the following health issues:      Dizziness  Onset: 2 days     Description:   Do you feel faint:  YES  Does it feel like the surroundings (bed, room) are moving: YES  Unsteady/off balance: YES  Have you passed out or fallen: no     Intensity: severe    Progression of Symptoms:  intermittent    Accompanying Signs & Symptoms:  Heart palpitations: YES  Nausea, vomiting: YES- nausea   Weakness in arms or legs: YES- left arm   Fatigue: YES  Vision or speech changes: YES  Ringing in ears (Tinnitus): YES  Hearing Loss: no     History:   Head trauma/concussion hx: no   Previous similar symptoms: YES- about a year ago   Recent bleeding history: no     Precipitating factors:   Worse with activity or head movement: YES  Any new medications (BP?): no   Alcohol/drug abuse/withdrawal: no     Alleviating factors:   Does staying in a fixed position give relief:  YES    Therapies Tried and outcome: Aspirin did not help    Patient is here with an acute episode of vertigo that started a few days ago.  Onset was sudden and did not seem to be precipitated by any particular movement.  She describes her sensation of dizziness as a sensation that the room is spinning.  She got somewhat flushed and nauseated with the symptoms as well as noting some bilateral tinnitus.  She had similar symptoms about 1 year ago associated with some vision changes in the right eye as well as a throbbing headache behind the right eye.  She saw her eye doctor at that time and no particular etiology was determined.  She had an MRI of her head done that was for the most part within normal limits but did not show any tumors or acute processes.  The tinnitus that she has currently is somewhat pulsatile.  She feels that her hearing is okay and she is actually been more sensitive to loud noises during the current symptoms.  Right now the symptoms of headache behind the right  eye increase when laying down and sitting up.  Last time symptoms gradually improved.  This is the second time she has had a constellation of symptoms similar to this.  She is worried about her blood pressure being a cause of this.        Problem list and histories reviewed & adjusted, as indicated.  Additional history: as documented    Patient Active Problem List   Diagnosis     Tinnitus     IBS (irritable bowel syndrome)     CARDIOVASCULAR SCREENING; LDL GOAL LESS THAN 160     Advanced directives, counseling/discussion     Abnormal Pap smear of cervix     BMI 31.0-31.9,adult     Atrophic vaginitis     H. pylori infection     LUQ abdominal pain     Mastalgia     Past Surgical History:   Procedure Laterality Date     C APPENDECTOMY       LASIK  9-00    bilat     TUBAL LIGATION         Social History     Tobacco Use     Smoking status: Never Smoker     Smokeless tobacco: Never Used   Substance Use Topics     Alcohol use: No     Family History   Problem Relation Age of Onset     Hypertension Mother      Colon Cancer Mother 76     Tumor Mother         on pituritary     Diabetes Father         adult onset     Cerebrovascular Disease Father         age 64     Heart Disease Father         age 65     Eye Disorder Father         GLAUCOMA     Hypertension Father      Heart Disease Maternal Grandmother         CHF     Heart Disease Maternal Grandfather      Alzheimer Disease Paternal Grandmother      Heart Disease Paternal Grandfather      Heart Disease Brother         aortic weak     Alcohol/Drug Sister         alcohol use and drugs use     Arthritis Brother         adult      Eye Disorder Brother         GLAUCOMA         Current Outpatient Medications   Medication Sig Dispense Refill     Ascorbic Acid (VITAMIN C PO) Take by mouth daily       Cholecalciferol (VITAMIN D PO) Take 5,000 Units by mouth daily       Multiple Vitamin (MULTI-VITAMIN PO)        predniSONE (DELTASONE) 20 MG tablet Take 20 mg by mouth 2 times daily for  "7 days. 14 tablet 0     Allergies   Allergen Reactions     Sulfa Drugs      sensitvity     Yeast      Gas/ bloating/ nausea       Reviewed and updated as needed this visit by clinical staff  Tobacco  Allergies  Meds  Problems  Med Hx  Surg Hx  Fam Hx  Soc Hx        Reviewed and updated as needed this visit by Provider  Tobacco  Allergies  Meds  Problems  Med Hx  Surg Hx  Fam Hx  Soc Hx          ROS:  Constitutional, HEENT, cardiovascular, pulmonary, gi and gu systems are negative, except as otherwise noted.    OBJECTIVE:     /86   Pulse 97   Temp 96.8  F (36  C) (Oral)   Ht 1.619 m (5' 3.75\")   Wt 85.3 kg (188 lb)   LMP 12/28/2014   SpO2 97%   BMI 32.52 kg/m    Body mass index is 32.52 kg/m .  GENERAL: healthy, alert and no distress  EYES: Eyes grossly normal to inspection, PERRL and conjunctivae and sclerae normal  HENT: ear canals and TM's normal, nose and mouth without ulcers or lesions, temporal artery pulses were normal bilaterally.  NECK: no adenopathy, no asymmetry, masses, or scars and thyroid normal to palpation  RESP: lungs clear to auscultation - no rales, rhonchi or wheezes  CV: regular rate and rhythm, normal S1 S2, no S3 or S4, no murmur, click or rub, no peripheral edema and peripheral pulses strong  ABDOMEN: soft, nontender, no hepatosplenomegaly, no masses and bowel sounds normal  MS: no gross musculoskeletal defects noted, no edema  SKIN: no suspicious lesions or rashes  NEURO: Normal strength and tone, mentation intact and speech normal  NEURO: sensory exam grossly normal, mentation intact, speech normal, cranial nerves 2-12 intact, DTR's normal and symmetric 2+, gait normal, Romberg normal and no pronator drift was apparent.  Finger-nose-finger was normal.  PSYCH: mentation appears normal, affect normal/bright    Diagnostic Test Results:  none     ASSESSMENT/PLAN:             1. Acute nonintractable headache, unspecified headache type  I think my underlying suspicion is " that her current symptoms may be actually be related to migraine.  She has a number of symptoms to suggest that but she has not had enough episodes to really reach that diagnosis with confidence.  I did not repeat imaging at this time as an MRI was done last year for similar symptoms.  On that MRI she did have some potential findings consistent with a history of migraine.  I have suggested a consult with neurology and provided prednisone 20 mg daily for 7 days to see if this would help alleviate the symptoms.  She could use an over-the-counter nonsteroid anti-inflammatory for pain.  - NEUROLOGY ADULT REFERRAL  - predniSONE (DELTASONE) 20 MG tablet; Take 20 mg by mouth 2 times daily for 7 days.  Dispense: 14 tablet; Refill: 0    2. Vertigo  Vertigo in the setting of tinnitus so I have suggested ENT referral to assess her hearing.  If there is any asymmetry she could benefit again from MRI imaging of her head.  Laboratory studies as noted although I suspect these will be normal.  She mentioned a family history of lupus so an CARLITO was checked today, although I think that possibility is low.  - Anti Nuclear Pamela IgG by IFA with Reflex  - ESR: Erythrocyte sedimentation rate  - Basic metabolic panel  (Ca, Cl, CO2, Creat, Gluc, K, Na, BUN)  - CBC with platelets  - Lyme Disease Pamela with reflex to WB Serum  - OTOLARYNGOLOGY REFERRAL  - NEUROLOGY ADULT REFERRAL    3. Tinnitus, bilateral  ENT/audiology referral as above for hearing assessment.      See Patient Instructions    Zack Castro MD  [facility]

## 2019-03-05 LAB — B BURGDOR IGG+IGM SER QL: 0.02 (ref 0–0.89)

## 2019-03-07 LAB — ANA SER QL IF: NEGATIVE

## 2019-08-15 ENCOUNTER — TELEPHONE (OUTPATIENT)
Dept: FAMILY MEDICINE | Facility: CLINIC | Age: 59
End: 2019-08-15

## 2019-08-15 NOTE — LETTER
August 28, 2019    Teresa Mueller   [address] [address]  [place] [address]      Dear Teresa Mueller,     We have tried to contact you about your health, but have been unable to reach you.  Please call us as soon as possible so we can provide you with the best care possible.  We will continue to check in with you throughout the year to complete these items of care, if you are not able to complete these items at this time.  If you would like to complete the missing items for your care, please contact us at [phone].    We recommend the following:  -complete a FIT TEST a FIT test or Fecal Immunochemical Occult Blood Test is a take home stool sample kit.  It does not replace the colonoscopy for colorectal cancer screening, but it can detect hidden bleeding in the lower colon.  It does need to be repeated every year and if a positive result is obtained, you would be referred for a colonoscopy.  If you have completed either one of these tests at another facility, please have the records sent to our clinic so that we can best coordinate your care.        Sincerely,     Your Care Team at [place]

## 2019-08-15 NOTE — TELEPHONE ENCOUNTER
Panel Management Review      Patient has the following on her problem list: None      Composite cancer screening  Chart review shows that this patient is due/due soon for the following Fecal Colorectal (FIT)  Summary:    Patient is due/failing the following:   FIT    Action needed:   Patient needs referral/order: FIT    Type of outreach:    Sent letter.    Questions for provider review:    None                                                                                                                                    Sylvia Woods MA       Chart routed to Care Team .

## 2019-09-30 ENCOUNTER — HEALTH MAINTENANCE LETTER (OUTPATIENT)
Age: 59
End: 2019-09-30

## 2019-12-11 ENCOUNTER — OFFICE VISIT (OUTPATIENT)
Dept: FAMILY MEDICINE | Facility: CLINIC | Age: 59
End: 2019-12-11
Payer: COMMERCIAL

## 2019-12-11 VITALS
DIASTOLIC BLOOD PRESSURE: 70 MMHG | SYSTOLIC BLOOD PRESSURE: 122 MMHG | HEIGHT: 64 IN | WEIGHT: 184.8 LBS | OXYGEN SATURATION: 99 % | BODY MASS INDEX: 31.55 KG/M2 | HEART RATE: 74 BPM | RESPIRATION RATE: 20 BRPM | TEMPERATURE: 98.1 F

## 2019-12-11 DIAGNOSIS — Z12.11 SPECIAL SCREENING FOR MALIGNANT NEOPLASMS, COLON: ICD-10-CM

## 2019-12-11 DIAGNOSIS — Z23 FLU VACCINE NEED: ICD-10-CM

## 2019-12-11 DIAGNOSIS — Z00.00 ROUTINE GENERAL MEDICAL EXAMINATION AT A HEALTH CARE FACILITY: Primary | ICD-10-CM

## 2019-12-11 LAB
CHOLEST SERPL-MCNC: 216 MG/DL
GLUCOSE SERPL-MCNC: 92 MG/DL (ref 70–99)
HDLC SERPL-MCNC: 63 MG/DL
LDLC SERPL CALC-MCNC: 133 MG/DL
NONHDLC SERPL-MCNC: 153 MG/DL
TRIGL SERPL-MCNC: 99 MG/DL

## 2019-12-11 PROCEDURE — 36415 COLL VENOUS BLD VENIPUNCTURE: CPT | Performed by: PHYSICIAN ASSISTANT

## 2019-12-11 PROCEDURE — 80061 LIPID PANEL: CPT | Performed by: PHYSICIAN ASSISTANT

## 2019-12-11 PROCEDURE — 99396 PREV VISIT EST AGE 40-64: CPT | Mod: 25 | Performed by: PHYSICIAN ASSISTANT

## 2019-12-11 PROCEDURE — 90682 RIV4 VACC RECOMBINANT DNA IM: CPT | Performed by: PHYSICIAN ASSISTANT

## 2019-12-11 PROCEDURE — 90471 IMMUNIZATION ADMIN: CPT | Performed by: PHYSICIAN ASSISTANT

## 2019-12-11 PROCEDURE — 82947 ASSAY GLUCOSE BLOOD QUANT: CPT | Performed by: PHYSICIAN ASSISTANT

## 2019-12-11 ASSESSMENT — ENCOUNTER SYMPTOMS
NERVOUS/ANXIOUS: 0
ABDOMINAL PAIN: 0
HEMATOCHEZIA: 0
EYE PAIN: 1
FREQUENCY: 0
COUGH: 0
DIZZINESS: 1
CONSTIPATION: 0
HEMATURIA: 0
FEVER: 0
DIARRHEA: 0
CHILLS: 0

## 2019-12-11 ASSESSMENT — MIFFLIN-ST. JEOR: SCORE: 1393.5

## 2019-12-11 NOTE — PROGRESS NOTES
SUBJECTIVE:   CC: Teresa Mueller is an 59 year old woman who presents for preventive health visit.     Healthy Habits:     Getting at least 3 servings of Calcium per day:  Yes    Bi-annual eye exam:  Yes    Dental care twice a year:  Yes    Sleep apnea or symptoms of sleep apnea:  None    Diet:  Low salt and Carbohydrate counting    Frequency of exercise:  1 day/week    Duration of exercise:  15-30 minutes    Taking medications regularly:  Not Applicable    Barriers to taking medications:  None    Medication side effects:  Not applicable    PHQ-2 Total Score: 0    Additional concerns today:  No          -------------------------------------    Today's PHQ-2 Score:   PHQ-2 ( 1999 Pfizer) 12/11/2019   Q1: Little interest or pleasure in doing things 0   Q2: Feeling down, depressed or hopeless 0   PHQ-2 Score 0   Q1: Little interest or pleasure in doing things Not at all   Q2: Feeling down, depressed or hopeless Not at all   PHQ-2 Score 0       Abuse: Current or Past(Physical, Sexual or Emotional)- No  Do you feel safe in your environment? Yes    Have you ever done Advance Care Planning? (For example, a Health Directive, POLST, or a discussion with a medical provider or your loved ones about your wishes): Yes, patient states has an Advance Care Planning document and will bring a copy to the clinic.    Social History     Tobacco Use     Smoking status: Never Smoker     Smokeless tobacco: Never Used   Substance Use Topics     Alcohol use: No         Alcohol Use 12/11/2019   Prescreen: >3 drinks/day or >7 drinks/week? Not Applicable   Prescreen: >3 drinks/day or >7 drinks/week? -       Reviewed orders with patient.  Reviewed health maintenance and updated orders accordingly - Yes      Pertinent mammograms are reviewed under the imaging tab.  History of abnormal Pap smear: NO - age 30-65 PAP every 5 years with negative HPV co-testing recommended  PAP / HPV Latest Ref Rng & Units 12/18/2017 12/19/2014 12/23/2011   PAP  "- NIL NIL NIL   HPV 16 DNA NEG:Negative Negative - -   HPV 18 DNA NEG:Negative Negative - -   OTHER HR HPV NEG:Negative Negative - -     Reviewed and updated as needed this visit by clinical staff  Tobacco  Allergies  Meds  Med Hx  Surg Hx  Fam Hx  Soc Hx        Reviewed and updated as needed this visit by Provider          Review of Systems   Constitutional: Negative for chills and fever.   HENT: Negative for congestion and ear pain.    Eyes: Positive for pain.   Respiratory: Negative for cough.    Cardiovascular: Negative for chest pain.   Gastrointestinal: Negative for abdominal pain, constipation, diarrhea and hematochezia.   Genitourinary: Negative for frequency and hematuria.   Neurological: Positive for dizziness.   Psychiatric/Behavioral: The patient is not nervous/anxious.           OBJECTIVE:   /70   Pulse 74   Temp 98.1  F (36.7  C) (Oral)   Resp 20   Ht 1.618 m (5' 3.7\")   Wt 83.8 kg (184 lb 12.8 oz)   LMP 12/28/2014   SpO2 99%   BMI 32.02 kg/m       Physical Exam  GENERAL APPEARANCE: healthy, alert and no distress  EYES: Eyes grossly normal to inspection, PERRL and conjunctivae and sclerae normal  HENT: ear canals and TM's normal, nose and mouth without ulcers or lesions, oropharynx clear and oral mucous membranes moist  NECK: no adenopathy, no asymmetry, masses, or scars and thyroid normal to palpation  RESP: lungs clear to auscultation - no rales, rhonchi or wheezes  CV: regular rate and rhythm, normal S1 S2, no S3 or S4, no murmur, click or rub, no peripheral edema and peripheral pulses strong  ABDOMEN: soft, nontender, no hepatosplenomegaly, no masses and bowel sounds normal  MS: no musculoskeletal defects are noted and gait is age appropriate without ataxia  SKIN: no suspicious lesions or rashes  NEURO: Normal strength and tone, sensory exam grossly normal, mentation intact and speech normal  PSYCH: mentation appears normal and affect normal/bright    Diagnostic Test " "Results:  none     ASSESSMENT/PLAN:   1. Routine general medical examination at a health care facility  - Lipid panel reflex to direct LDL Fasting  - Glucose    2. Special screening for malignant neoplasms, colon  - GASTROENTEROLOGY ADULT REF PROCEDURE ONLY    3. Flu vaccine need  - C RIV4 (FLUBLOK) VACCINE RECOMBINANT DNA PRSRV ANTIBIO FREE, IM [[id number]]    COUNSELING:  Reviewed preventive health counseling, as reflected in patient instructions    Estimated body mass index is 32.02 kg/m  as calculated from the following:    Height as of this encounter: 1.618 m (5' 3.7\").    Weight as of this encounter: 83.8 kg (184 lb 12.8 oz).    Weight management plan: Discussed healthy diet and exercise guidelines     reports that she has never smoked. She has never used smokeless tobacco.      Counseling Resources:  ATP IV Guidelines  Pooled Cohorts Equation Calculator  Breast Cancer Risk Calculator  FRAX Risk Assessment  ICSI Preventive Guidelines  Dietary Guidelines for Americans, 2010  USDA's MyPlate  ASA Prophylaxis  Lung CA Screening    Isha Olivier PA-C  [facility]  "

## 2020-09-03 ENCOUNTER — TELEPHONE (OUTPATIENT)
Dept: FAMILY MEDICINE | Facility: CLINIC | Age: 60
End: 2020-09-03

## 2020-09-03 NOTE — TELEPHONE ENCOUNTER
Patient believes she has H pylori and is transmitting it between herself, her , and her father. She would like an order placed to leave a stool sample. Please call to advise. [facility] TC/Pt Rep

## 2020-09-08 NOTE — TELEPHONE ENCOUNTER
Called and spoke to patient. Informed patient of message. Patient stated that she is all booked for her schedule and will call back later to schedule an appointment.  Maribeth Harris CMA

## 2021-01-15 ENCOUNTER — HEALTH MAINTENANCE LETTER (OUTPATIENT)
Age: 61
End: 2021-01-15

## 2021-10-24 ENCOUNTER — HEALTH MAINTENANCE LETTER (OUTPATIENT)
Age: 61
End: 2021-10-24

## 2021-12-27 ENCOUNTER — OFFICE VISIT (OUTPATIENT)
Dept: FAMILY MEDICINE | Facility: CLINIC | Age: 61
End: 2021-12-27
Payer: COMMERCIAL

## 2021-12-27 VITALS
TEMPERATURE: 98.6 F | WEIGHT: 177.8 LBS | SYSTOLIC BLOOD PRESSURE: 144 MMHG | DIASTOLIC BLOOD PRESSURE: 86 MMHG | HEIGHT: 64 IN | BODY MASS INDEX: 30.35 KG/M2 | OXYGEN SATURATION: 96 % | HEART RATE: 75 BPM

## 2021-12-27 DIAGNOSIS — Z12.31 ENCOUNTER FOR SCREENING MAMMOGRAM FOR MALIGNANT NEOPLASM OF BREAST: ICD-10-CM

## 2021-12-27 DIAGNOSIS — R03.0 ELEVATED BLOOD PRESSURE READING WITHOUT DIAGNOSIS OF HYPERTENSION: ICD-10-CM

## 2021-12-27 DIAGNOSIS — R73.9 HYPERGLYCEMIA: ICD-10-CM

## 2021-12-27 DIAGNOSIS — Z12.11 SPECIAL SCREENING FOR MALIGNANT NEOPLASMS, COLON: ICD-10-CM

## 2021-12-27 DIAGNOSIS — Z13.6 ENCOUNTER FOR LIPID SCREENING FOR CARDIOVASCULAR DISEASE: ICD-10-CM

## 2021-12-27 DIAGNOSIS — Z00.00 ROUTINE GENERAL MEDICAL EXAMINATION AT A HEALTH CARE FACILITY: Primary | ICD-10-CM

## 2021-12-27 DIAGNOSIS — Z13.220 ENCOUNTER FOR LIPID SCREENING FOR CARDIOVASCULAR DISEASE: ICD-10-CM

## 2021-12-27 LAB
ALBUMIN SERPL-MCNC: 4.1 G/DL (ref 3.4–5)
ALP SERPL-CCNC: 88 U/L (ref 40–150)
ALT SERPL W P-5'-P-CCNC: 21 U/L (ref 0–50)
ANION GAP SERPL CALCULATED.3IONS-SCNC: 1 MMOL/L (ref 3–14)
AST SERPL W P-5'-P-CCNC: 21 U/L (ref 0–45)
BILIRUB SERPL-MCNC: 0.5 MG/DL (ref 0.2–1.3)
BUN SERPL-MCNC: 11 MG/DL (ref 7–30)
CALCIUM SERPL-MCNC: 9.7 MG/DL (ref 8.5–10.1)
CHLORIDE BLD-SCNC: 109 MMOL/L (ref 94–109)
CHOLEST SERPL-MCNC: 238 MG/DL
CO2 SERPL-SCNC: 30 MMOL/L (ref 20–32)
CREAT SERPL-MCNC: 0.93 MG/DL (ref 0.52–1.04)
FASTING STATUS PATIENT QL REPORTED: YES
GFR SERPL CREATININE-BSD FRML MDRD: 70 ML/MIN/1.73M2
GLUCOSE BLD-MCNC: 106 MG/DL (ref 70–99)
HBA1C MFR BLD: 5.5 % (ref 0–5.6)
HDLC SERPL-MCNC: 61 MG/DL
LDLC SERPL CALC-MCNC: 140 MG/DL
NONHDLC SERPL-MCNC: 177 MG/DL
POTASSIUM BLD-SCNC: 4.4 MMOL/L (ref 3.4–5.3)
PROT SERPL-MCNC: 7.7 G/DL (ref 6.8–8.8)
SODIUM SERPL-SCNC: 140 MMOL/L (ref 133–144)
TRIGL SERPL-MCNC: 183 MG/DL

## 2021-12-27 PROCEDURE — 99396 PREV VISIT EST AGE 40-64: CPT | Performed by: FAMILY MEDICINE

## 2021-12-27 PROCEDURE — 80061 LIPID PANEL: CPT | Performed by: FAMILY MEDICINE

## 2021-12-27 PROCEDURE — 83036 HEMOGLOBIN GLYCOSYLATED A1C: CPT | Performed by: FAMILY MEDICINE

## 2021-12-27 PROCEDURE — 36415 COLL VENOUS BLD VENIPUNCTURE: CPT | Performed by: FAMILY MEDICINE

## 2021-12-27 PROCEDURE — 80053 COMPREHEN METABOLIC PANEL: CPT | Performed by: FAMILY MEDICINE

## 2021-12-27 ASSESSMENT — ENCOUNTER SYMPTOMS
DYSURIA: 0
FEVER: 0
DIARRHEA: 0
SHORTNESS OF BREATH: 0
CHILLS: 0
NERVOUS/ANXIOUS: 0
ABDOMINAL PAIN: 0
NAUSEA: 0
CONSTIPATION: 0
BREAST MASS: 0
HEARTBURN: 0
COUGH: 0
SORE THROAT: 0
JOINT SWELLING: 0
FREQUENCY: 0
HEMATOCHEZIA: 0
EYE PAIN: 0
MYALGIAS: 0
DIZZINESS: 0
HEADACHES: 0
HEMATURIA: 0
PARESTHESIAS: 0
PALPITATIONS: 0
WEAKNESS: 0
ARTHRALGIAS: 0

## 2021-12-27 ASSESSMENT — MIFFLIN-ST. JEOR: SCORE: 1353.01

## 2021-12-27 NOTE — PROGRESS NOTES
SUBJECTIVE:   CC: Teresa Mueller is an 61 year old woman who presents for preventive health visit.   Patient has been advised of split billing requirements and indicates understanding: No  Healthy Habits:     Getting at least 3 servings of Calcium per day:  NO    Bi-annual eye exam:  Yes    Dental care twice a year:  Yes    Sleep apnea or symptoms of sleep apnea:  None    Diet:  Regular (no restrictions)    Frequency of exercise:  1 day/week    Duration of exercise:  45-60 minutes    Taking medications regularly:  Yes    Medication side effects:  None    PHQ-2 Total Score: 0    Additional concerns today:  No    Wt Readings from Last 4 Encounters:   12/27/21 80.6 kg (177 lb 12.8 oz)   12/11/19 83.8 kg (184 lb 12.8 oz)   03/04/19 85.3 kg (188 lb)   07/17/18 79.6 kg (175 lb 6.4 oz)     BP Readings from Last 6 Encounters:   12/27/21 (!) 152/86   12/11/19 122/70   03/04/19 137/86   07/17/18 136/76   05/31/18 114/78   04/20/18 130/88     Elevated blood pressure  No history of HTN  Concerned due to family history  Has bp cuff at home        Today's PHQ-2 Score:   PHQ-2 ( 1999 Pfizer) 12/27/2021   Q1: Little interest or pleasure in doing things 0   Q2: Feeling down, depressed or hopeless 0   PHQ-2 Score 0   PHQ-2 Total Score (12-17 Years)- Positive if 3 or more points; Administer PHQ-A if positive -   Q1: Little interest or pleasure in doing things Not at all   Q2: Feeling down, depressed or hopeless Not at all   PHQ-2 Score 0       Abuse: Current or Past (Physical, Sexual or Emotional) - No  Do you feel safe in your environment? Yes        Social History     Tobacco Use     Smoking status: Never Smoker     Smokeless tobacco: Never Used   Substance Use Topics     Alcohol use: No     If you drink alcohol do you typically have >3 drinks per day or >7 drinks per week? No    Alcohol Use 12/27/2021   Prescreen: >3 drinks/day or >7 drinks/week? No   Prescreen: >3 drinks/day or >7 drinks/week? -   No flowsheet data  found.    Reviewed orders with patient.  Reviewed health maintenance and updated orders accordingly - Yes  BP Readings from Last 3 Encounters:   12/27/21 (!) 144/86   12/11/19 122/70   03/04/19 137/86    Wt Readings from Last 3 Encounters:   12/27/21 80.6 kg (177 lb 12.8 oz)   12/11/19 83.8 kg (184 lb 12.8 oz)   03/04/19 85.3 kg (188 lb)                    Breast Cancer Screening:    FHS-7:   Breast CA Risk Assessment (FHS-7) 12/27/2021   Did any of your first-degree relatives have breast or ovarian cancer? No   Did any of your relatives have bilateral breast cancer? No   Did any man in your family have breast cancer? No   Did any woman in your family have breast and ovarian cancer? Yes   Did any woman in your family have breast cancer before age 50 y? Yes   Do you have 2 or more relatives with breast and/or ovarian cancer? No   Do you have 2 or more relatives with breast and/or bowel cancer? No         Pertinent mammograms are reviewed under the imaging tab.    History of abnormal Pap smear: NO - age 30-65 PAP every 5 years with negative HPV co-testing recommended  PAP / HPV Latest Ref Rng & Units 12/18/2017 12/19/2014 12/23/2011   PAP (Historical) - NIL NIL NIL   HPV16 NEG:Negative Negative - -   HPV18 NEG:Negative Negative - -   HRHPV NEG:Negative Negative - -     Reviewed and updated as needed this visit by clinical staff  Tobacco  Allergies  Meds             Reviewed and updated as needed this visit by Provider               Review of Systems   Constitutional: Negative for chills and fever.   HENT: Negative for congestion, ear pain, hearing loss and sore throat.    Eyes: Negative for pain and visual disturbance.   Respiratory: Negative for cough and shortness of breath.    Cardiovascular: Negative for chest pain, palpitations and peripheral edema.   Gastrointestinal: Negative for abdominal pain, constipation, diarrhea, heartburn, hematochezia and nausea.   Breasts:  Negative for tenderness, breast mass and  "discharge.   Genitourinary: Negative for dysuria, frequency, genital sores, hematuria, pelvic pain, urgency, vaginal bleeding and vaginal discharge.   Musculoskeletal: Negative for arthralgias, joint swelling and myalgias.   Skin: Negative for rash.   Neurological: Negative for dizziness, weakness, headaches and paresthesias.   Psychiatric/Behavioral: Negative for mood changes. The patient is not nervous/anxious.         OBJECTIVE:   BP (!) 152/86   Pulse 75   Temp 98.6  F (37  C) (Oral)   Ht 1.62 m (5' 3.78\")   Wt 80.6 kg (177 lb 12.8 oz)   LMP 12/28/2014   SpO2 96%   BMI 30.73 kg/m    Physical Exam  GENERAL: healthy, alert and no distress  EYES: Eyes grossly normal to inspection, PERRL and conjunctivae and sclerae normal  HENT: ear canals and TM's normal, nose and mouth without ulcers or lesions  NECK: no adenopathy, no asymmetry, masses, or scars and thyroid normal to palpation  RESP: lungs clear to auscultation - no rales, rhonchi or wheezes  CV: regular rate and rhythm, normal S1 S2, no S3 or S4, no murmur, click or rub, no peripheral edema and peripheral pulses strong  SKIN: no suspicious lesions or rashes  PSYCH: mentation appears normal, affect normal/bright      ASSESSMENT/PLAN:       ICD-10-CM    1. Routine general medical examination at a health care facility  Z00.00 REVIEW OF HEALTH MAINTENANCE PROTOCOL ORDERS     Comprehensive metabolic panel     Comprehensive metabolic panel   2. Encounter for lipid screening for cardiovascular disease  Z13.220 Hemoglobin A1c    Z13.6 Lipid panel reflex to direct LDL Fasting     Lipid panel reflex to direct LDL Fasting     Hemoglobin A1c   3. Hyperglycemia  R73.9    4. Encounter for screening mammogram for malignant neoplasm of breast  Z12.31 MA Screening Digital Bilateral   5. Special screening for malignant neoplasms, colon  Z12.11 COLOGUARD(EXACT SCIENCES)   6. Elevated blood pressure reading without diagnosis of hypertension  R03.0    7. BMI 30.0-30.9,adult  " "Z68.30        Patient has been advised of split billing requirements and indicates understanding: Yes  COUNSELING:  Reviewed preventive health counseling, as reflected in patient instructions       Regular exercise       Healthy diet/nutrition    Estimated body mass index is 30.73 kg/m  as calculated from the following:    Height as of this encounter: 1.62 m (5' 3.78\").    Weight as of this encounter: 80.6 kg (177 lb 12.8 oz).    Weight management plan: Discussed healthy diet and exercise guidelines    She reports that she has never smoked. She has never used smokeless tobacco.      Counseling Resources:  ATP IV Guidelines  Pooled Cohorts Equation Calculator  Breast Cancer Risk Calculator  BRCA-Related Cancer Risk Assessment: FHS-7 Tool  FRAX Risk Assessment  ICSI Preventive Guidelines  Dietary Guidelines for Americans, 2010  USDA's MyPlate  ASA Prophylaxis  Lung CA Screening    Zack Hu MD  [facility]  "

## 2021-12-27 NOTE — PATIENT INSTRUCTIONS
Preventive Health Recommendations  Female Ages 50 - 64    Yearly exam: See your health care provider every year in order to  o Review health changes.   o Discuss preventive care.    o Review your medicines if your doctor has prescribed any.      Get a Pap test every three years (unless you have an abnormal result and your provider advises testing more often).    If you get Pap tests with HPV test, you only need to test every 5 years, unless you have an abnormal result.     You do not need a Pap test if your uterus was removed (hysterectomy) and you have not had cancer.    You should be tested each year for STDs (sexually transmitted diseases) if you're at risk.     Have a mammogram every 1 to 2 years.    Have a colonoscopy at age 50, or have a yearly FIT test (stool test). These exams screen for colon cancer.      Have a cholesterol test every 5 years, or more often if advised.    Have a diabetes test (fasting glucose) every three years. If you are at risk for diabetes, you should have this test more often.     If you are at risk for osteoporosis (brittle bone disease), think about having a bone density scan (DEXA).    Shots: Get a flu shot each year. Get a tetanus shot every 10 years.    Nutrition:     Eat at least 5 servings of fruits and vegetables each day.    Eat whole-grain bread, whole-wheat pasta and brown rice instead of white grains and rice.    Get adequate Calcium and Vitamin D.     Lifestyle    Exercise at least 150 minutes a week (30 minutes a day, 5 days a week). This will help you control your weight and prevent disease.    Limit alcohol to one drink per day.    No smoking.     Wear sunscreen to prevent skin cancer.     See your dentist every six months for an exam and cleaning.    See your eye doctor every 1 to 2 years.      Healthy Lifestyle   Nutrition and healthy eating: The Mediterranean Diet  Ready to switch to a more heart-healthy diet? Here's how to get started with the Mediterranean  diet.  By [facility] Staff   If you're looking for a heart-healthy eating plan, the Mediterranean diet might be right for you.  The Mediterranean diet blends the basics of healthy eating with the traditional flavors and cooking methods of the [place].  Interest in the Mediterranean diet began in the 1960s with the observation that coronary heart disease caused fewer deaths in Mediterranean countries, such as [place] and [place], than in the [place] and [place]. Subsequent studies found that the Mediterranean diet is associated with reduced risk factors for cardiovascular disease.  The Mediterranean diet is one of the healthy eating plans recommended by the Dietary Guidelines for Americans to promote health and prevent chronic disease.  It is also recognized by the World Health Organization as a healthy and sustainable dietary pattern and as an intangible cultural asset by the United National Educational, Scientific and Cultural Organization.  The Mediterranean diet is a way of eating based on the traditional cuisine of countries bordering the [place] Sea. While there is no single definition of the Mediterranean diet, it is typically high in vegetables, fruits, whole grains, beans, nut and seeds, and olive oil.  The main components of Mediterranean diet include:    Daily consumption of vegetables, fruits, whole grains and healthy fats     Weekly intake of fish, poultry, beans and eggs     Moderate portions of dairy products     Limited intake of red meat  Other important elements of the Mediterranean diet are sharing meals with family and friends, enjoying a glass of red wine and being physically active.  The foundation of the Mediterranean diet is vegetables, fruits, herbs, nuts, beans and whole grains. Meals are built around these plant-based foods. Moderate amounts of dairy, poultry and eggs are also central to the Mediterranean Diet, as is seafood. In contrast, red meat is eaten only  "occasionally.  Healthy fats are a mainstay of the Mediterranean diet. They're eaten instead of less healthy fats, such as saturated and trans fats, which contribute to heart disease.  Olive oil is the primary source of added fat in the Mediterranean diet. Olive oil provides monounsaturated fat, which has been found to lower total cholesterol and low-density lipoprotein (LDL or \"bad\") cholesterol levels. Nuts and seeds also contain monounsaturated fat.  Fish are also important in the Mediterranean diet. Fatty fish -- such as mackerel, herring, sardines, albacore tuna, salmon and lake trout -- are rich in omega-3 fatty acids, a type of polyunsaturated fat that may reduce inflammation in the body. Omega-3 fatty acids also help decrease triglycerides, reduce blood clotting, and decrease the risk of stroke and heart failure.  The Mediterranean diet typically allows red wine in moderation. Although alcohol has been associated with a reduced risk of heart disease in some studies, it's by no means risk free. The Dietary Guidelines for Americans caution against beginning to drink or drinking more often on the basis of potential health benefits.  Interested in trying the Mediterranean diet? These tips will help you get started:    Eat more fruits and vegetables. Aim for 7 to 10 servings a day of fruit and vegetables.     Opt for whole grains. Switch to whole-grain bread, cereal and pasta. [place] with other whole grains, such as bulgur and farro.     Use healthy fats. Try olive oil as a replacement for butter when cooking. Instead of putting butter or margarine on bread, try dipping it in flavored olive oil.     Eat more seafood. Eat fish twice a week. Fresh or water-packed tuna, salmon, trout, mackerel and herring are healthy choices. Grilled fish tastes good and requires little cleanup. Avoid deep-fried fish.     Reduce red meat. Substitute fish, poultry or beans for meat. If you eat meat, make sure it's lean and keep " portions small.     Enjoy some dairy. Eat low-fat Greek or plain yogurt and small amounts of a variety of cheeses.     Spice it up. Herbs and spices boost flavor and lessen the need for salt.  The Mediterranean diet is a delicious and healthy way to eat. Many people who switch to this style of eating say they'll never eat any other way.

## 2022-02-10 ENCOUNTER — TRANSFERRED RECORDS (OUTPATIENT)
Dept: HEALTH INFORMATION MANAGEMENT | Facility: CLINIC | Age: 62
End: 2022-02-10
Payer: COMMERCIAL

## 2022-04-02 ENCOUNTER — OFFICE VISIT (OUTPATIENT)
Dept: URGENT CARE | Facility: URGENT CARE | Age: 62
End: 2022-04-02
Payer: COMMERCIAL

## 2022-04-02 VITALS
SYSTOLIC BLOOD PRESSURE: 146 MMHG | BODY MASS INDEX: 30.8 KG/M2 | WEIGHT: 178.2 LBS | DIASTOLIC BLOOD PRESSURE: 88 MMHG | TEMPERATURE: 98.7 F | OXYGEN SATURATION: 95 % | HEART RATE: 79 BPM
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
## 2022-04-02 DIAGNOSIS — S61.451A CAT BITE OF RIGHT HAND, INITIAL ENCOUNTER: Primary | ICD-10-CM

## 2022-04-02 PROCEDURE — 99213 OFFICE O/P EST LOW 20 MIN: CPT | Performed by: FAMILY MEDICINE

## 2022-04-02 NOTE — PROGRESS NOTES
Assessment & Plan     Cat bite of right hand, initial encounter  61-year-old female presented with cat bite involving both hands, happened this morning.  Physical examination as described.  Cats vaccinations are up-to-date.  Patient had Tdap vaccine in 2020.  Augmentin prescribed for prophylactic coverage, common side effects discussed.  Recommended to keep the area dry and clean.  Follow-up if symptoms persist or worsen.  Written information provided.  All questions answered.         Patient Instructions     Patient Education     Cat Bite    A cat bite can cause a wound deep enough to break the skin. In such cases, the wound is cleaned and then sometimes closed. If the wound is closed it is usually not closed completely. This is so that fluid can drain if the wound becomes infected. Often the wound is left open to heal. In addition to wound care, a tetanus shot may be given, if needed.  Home care    Wash your hands well with soap and warm water before and after caring for the wound. This helps lower the risk of infection.    Care for the wound as directed. If a dressing was applied to the wound, be sure to change it as directed.    If the wound bleeds, place a clean, soft cloth on the wound. Then firmly apply pressure until the bleeding stops. This may take up to 5 minutes. Don't release the pressure and look at the wound during this time.    Always get medical attention for cat bites on the hand. They are highly likely to become infected.    Most wounds heal within 10 days. But an infection can occur even with proper treatment. So be sure to check the wound daily for signs of infection (see below).    Antibiotics may be prescribed. These help prevent or treat infection. If you re given antibiotics, take them as directed. Also be sure to complete the medicines.  Rabies prevention  Rabies is a virus that can be carried in certain animals. These can include domestic animals such as cats and dogs. Pets fully  vaccinated against rabies (2 shots) are at very low risk of infection. But because human rabies is almost always fatal, any biting pet should be confined for 10 days as an extra precaution. In general, if there is a risk for rabies, the following steps may need to be taken:    If someone s pet cat has bitten you, it should be kept in a secure area for the next 10 days to watch for signs of illness. If the pet owner won t allow this, contact your local animal control center. If the cat becomes ill or dies during that time, contact your local animal control center at once so the animal may be tested for rabies. If the cat stays healthy for the next 10 days, there is no danger of rabies in the animal or you.    If a stray cat bit you, contact your local animal control center. They can give information on capture, quarantine, and animal rabies testing.    If you can t find the animal that bit you in the next 2 days, and if rabies exists in your area, you may need to receive the rabies vaccine series. Call your healthcare provider right away. Or return to the emergency department promptly.    All animal bites should be reported to the local animal control center. If you were not given a form to fill out, you can report this yourself.  Follow-up care  Follow up with your healthcare provider, or as directed.  When to seek medical advice  Call your healthcare provider right away if any of these occur:    Signs of infection:  ? Spreading redness or warmth from the wound  ? Increased pain or swelling  ? Fever of 100.4 F (38 C) or higher, or as directed by your healthcare provider  ? Colored fluid or pus draining from the wound  ? Enlarged lymph nodes above the area that was bitten, such as lymph nodes in the armpit if you were bitten on the hand or arm. This may be a sign of cat-scratch disease (cat-scratch fever).    Signs of rabies infection:  ? Headache  ? Confusion  ? Strange behavior  ? Increased salivating or  drooling  ? Seizure    Decreased ability to move any body part near the bite area    Bleeding that can't be stopped after 5 minutes of firm pressure  [employer] last reviewed this educational content on 5/1/2018 2000-2021 [employer]. All rights reserved. This information is not intended as a substitute for professional medical care. Always follow your healthcare professional's instructions.                 Kranthi Hurt MD  [facility] TAMIKA Moore is a 61 year old who presents for the following health issues  accompanied by her spouse.    HPI     Concern -   Onset: this morning   Description: cat bite involving hands bilaterally   Intensity: moderate  Progression of Symptoms:  same  Accompanying Signs & Symptoms: none    Therapies tried and outcome: None        Review of Systems   Constitutional, HEENT, cardiovascular, pulmonary, gi and gu systems are negative, except as otherwise noted.      Objective    BP (!) 146/88 (BP Location: Left arm, Patient Position: Sitting, Cuff Size: Adult Regular)   Pulse 79   Temp 98.7  F (37.1  C) (Tympanic)   Wt 80.8 kg (178 lb 3.2 oz)   LMP 12/28/2014   SpO2 95%   BMI 30.80 kg/m    Body mass index is 30.8 kg/m .  Physical Exam   GENERAL: alert and no distress  MS: no gross musculoskeletal defects noted, no edema  MSK/SKIN: cat bite scratch marks/superficial puncture wounds involving hands bilaterally with some swelling, no discharge, erythema or active bleeding noted  NEURO: Normal strength and tone, mentation intact and speech normal  PSYCH: mentation appears normal, affect normal/bright

## 2022-04-04 ENCOUNTER — ALLIED HEALTH/NURSE VISIT (OUTPATIENT)
Dept: NURSING | Facility: CLINIC | Age: 62
End: 2022-04-04
Payer: COMMERCIAL

## 2022-04-04 VITALS
OXYGEN SATURATION: 100 % | SYSTOLIC BLOOD PRESSURE: 146 MMHG | RESPIRATION RATE: 15 BRPM | HEART RATE: 70 BPM | DIASTOLIC BLOOD PRESSURE: 89 MMHG | TEMPERATURE: 98.6 F

## 2022-04-04 DIAGNOSIS — B99.9 INFECTION: Primary | ICD-10-CM

## 2022-04-04 ASSESSMENT — PAIN SCALES - GENERAL: PAINLEVEL: MODERATE PAIN (4)

## 2022-04-04 NOTE — NURSING NOTE
"RN Triage:     Chief Complaint   Patient presents with     Infection     right hand infection after cat bite on 4/2       Initial BP (!) 146/89 (Cuff Size: Adult Large)   Pulse 70   Temp 98.6  F (37  C) (Tympanic)   Resp 15   LMP 12/28/2014   SpO2 100%  Estimated body mass index is 30.63 kg/m  as calculated from the following:    Height as of 12/27/21: 1.62 m (5' 3.78\").    Weight as of an earlier encounter on 4/4/22: 80.4 kg (177 lb 3.2 oz).  BP completed using cuff size: large    Assessment:  Patient is here in the clinic with right hand infection after a cat bite on Saturday.  Patient was prescribed an Amoxicillin in  the same day.  Today, she came in to the clinic for her virtual appointment as she thought it was in-person.  \"This morning, as I was brushing my teeth, a white puss the size of a quarter came out.  My dad, my  and I have MRSA, patient said.\"  The hand is swollen, red and has red strings going into the arm that is tender to touch.  Per provider's recommendation, patient was advised to go to the ER.  Patient will go to [facility] in [facility] as it is closer to home.    Triage Dispo: Huddle with Provider to determine plan: patient to go to the ER     Intervention: Patient will go to the ER    Regina Choudhary RN    "

## 2022-10-15 ENCOUNTER — HEALTH MAINTENANCE LETTER (OUTPATIENT)
Age: 62
End: 2022-10-15

## 2023-03-26 ENCOUNTER — HEALTH MAINTENANCE LETTER (OUTPATIENT)
Age: 63
End: 2023-03-26

## 2024-05-26 ENCOUNTER — HEALTH MAINTENANCE LETTER (OUTPATIENT)
Age: 64
End: 2024-05-26

## 2025-04-12 ENCOUNTER — HEALTH MAINTENANCE LETTER (OUTPATIENT)
Age: 65
End: 2025-04-12